# Patient Record
Sex: FEMALE | Race: BLACK OR AFRICAN AMERICAN | NOT HISPANIC OR LATINO | Employment: FULL TIME | URBAN - METROPOLITAN AREA
[De-identification: names, ages, dates, MRNs, and addresses within clinical notes are randomized per-mention and may not be internally consistent; named-entity substitution may affect disease eponyms.]

---

## 2023-02-21 LAB
ANION GAP IN SER/PLAS: 12 MMOL/L (ref 10–20)
CALCIDIOL (25 OH VITAMIN D3) (NG/ML) IN SER/PLAS: 14 NG/ML
CALCIUM (MG/DL) IN SER/PLAS: 9.3 MG/DL (ref 8.6–10.6)
CARBON DIOXIDE, TOTAL (MMOL/L) IN SER/PLAS: 27 MMOL/L (ref 21–32)
CHLORIDE (MMOL/L) IN SER/PLAS: 106 MMOL/L (ref 98–107)
CREATININE (MG/DL) IN SER/PLAS: 0.81 MG/DL (ref 0.5–1.05)
ERYTHROCYTE DISTRIBUTION WIDTH (RATIO) BY AUTOMATED COUNT: 13.2 % (ref 11.5–14.5)
ERYTHROCYTE MEAN CORPUSCULAR HEMOGLOBIN CONCENTRATION (G/DL) BY AUTOMATED: 31 G/DL (ref 32–36)
ERYTHROCYTE MEAN CORPUSCULAR VOLUME (FL) BY AUTOMATED COUNT: 83 FL (ref 80–100)
ERYTHROCYTES (10*6/UL) IN BLOOD BY AUTOMATED COUNT: 4.69 X10E12/L (ref 4–5.2)
ESTIMATED AVERAGE GLUCOSE FOR HBA1C: 123 MG/DL
GFR FEMALE: >90 ML/MIN/1.73M2
GLUCOSE (MG/DL) IN SER/PLAS: 111 MG/DL (ref 74–99)
HEMATOCRIT (%) IN BLOOD BY AUTOMATED COUNT: 39 % (ref 36–46)
HEMOGLOBIN (G/DL) IN BLOOD: 12.1 G/DL (ref 12–16)
HEMOGLOBIN A1C/HEMOGLOBIN TOTAL IN BLOOD: 5.9 %
HIV 1/ 2 AG/AB SCREEN: NONREACTIVE
LEUKOCYTES (10*3/UL) IN BLOOD BY AUTOMATED COUNT: 4.6 X10E9/L (ref 4.4–11.3)
NRBC (PER 100 WBCS) BY AUTOMATED COUNT: 0 /100 WBC (ref 0–0)
PLATELETS (10*3/UL) IN BLOOD AUTOMATED COUNT: 290 X10E9/L (ref 150–450)
POTASSIUM (MMOL/L) IN SER/PLAS: 3.8 MMOL/L (ref 3.5–5.3)
SODIUM (MMOL/L) IN SER/PLAS: 141 MMOL/L (ref 136–145)
THYROTROPIN (MIU/L) IN SER/PLAS BY DETECTION LIMIT <= 0.05 MIU/L: 1.08 MIU/L (ref 0.44–3.98)
UREA NITROGEN (MG/DL) IN SER/PLAS: 17 MG/DL (ref 6–23)

## 2023-02-22 LAB
CHLAMYDIA TRACH., AMPLIFIED: NEGATIVE
N. GONORRHEA, AMPLIFIED: NEGATIVE
SYPHILIS TOTAL AB: NONREACTIVE
TRICHOMONAS VAGINALIS: NEGATIVE

## 2023-10-17 PROBLEM — H61.23 BILATERAL IMPACTED CERUMEN: Status: ACTIVE | Noted: 2023-10-17

## 2023-10-17 PROBLEM — R42 VERTIGO: Status: ACTIVE | Noted: 2023-10-17

## 2023-10-17 PROBLEM — R06.7 SNEEZING WITH WATERY EYES: Status: ACTIVE | Noted: 2023-10-17

## 2023-10-17 PROBLEM — O46.90 VAGINAL BLEEDING DURING PREGNANCY (HHS-HCC): Status: ACTIVE | Noted: 2023-10-17

## 2023-10-17 PROBLEM — H93.12 SUBJECTIVE TINNITUS OF LEFT EAR: Status: ACTIVE | Noted: 2023-10-17

## 2023-10-17 PROBLEM — E83.42 HYPOMAGNESEMIA: Status: ACTIVE | Noted: 2023-10-17

## 2023-10-17 PROBLEM — G43.909 MIGRAINE WITHOUT STATUS MIGRAINOSUS, NOT INTRACTABLE: Status: ACTIVE | Noted: 2023-10-17

## 2023-10-17 PROBLEM — G43.009 MIGRAINE WITHOUT AURA AND WITHOUT STATUS MIGRAINOSUS, NOT INTRACTABLE: Status: ACTIVE | Noted: 2023-10-17

## 2023-10-17 PROBLEM — R09.81 NASAL CONGESTION: Status: ACTIVE | Noted: 2023-10-17

## 2023-10-17 PROBLEM — F41.0 PANIC ATTACK: Status: ACTIVE | Noted: 2023-10-17

## 2023-10-17 PROBLEM — T83.32XA DISPLACEMENT OF INTRAUTERINE CONTRACEPTIVE DEVICE: Status: ACTIVE | Noted: 2023-10-17

## 2023-10-17 PROBLEM — R35.0 URINARY FREQUENCY: Status: ACTIVE | Noted: 2023-10-17

## 2023-10-17 PROBLEM — E11.9 TYPE 2 DIABETES MELLITUS (MULTI): Status: ACTIVE | Noted: 2023-10-17

## 2023-10-17 PROBLEM — J31.0 RHINITIS MEDICAMENTOSA: Status: ACTIVE | Noted: 2023-10-17

## 2023-10-17 PROBLEM — N62 LARGE BREASTS: Status: ACTIVE | Noted: 2023-10-17

## 2023-10-17 PROBLEM — R44.8 FACIAL PRESSURE: Status: ACTIVE | Noted: 2023-10-17

## 2023-10-17 PROBLEM — J06.9 UPPER RESPIRATORY INFECTION: Status: ACTIVE | Noted: 2023-10-17

## 2023-10-17 PROBLEM — T48.5X5A RHINITIS MEDICAMENTOSA: Status: ACTIVE | Noted: 2023-10-17

## 2023-10-17 PROBLEM — H69.93 DYSFUNCTION OF BOTH EUSTACHIAN TUBES: Status: ACTIVE | Noted: 2023-10-17

## 2023-10-17 PROBLEM — R79.89 LOW THYROID STIMULATING HORMONE (TSH) LEVEL: Status: ACTIVE | Noted: 2023-10-17

## 2023-10-17 PROBLEM — K21.9 GERD (GASTROESOPHAGEAL REFLUX DISEASE): Status: ACTIVE | Noted: 2023-10-17

## 2023-10-17 PROBLEM — R09.A2 GLOBUS SENSATION: Status: ACTIVE | Noted: 2023-10-17

## 2023-10-17 PROBLEM — H93.8X3 SENSATION OF FULLNESS IN BOTH EARS: Status: ACTIVE | Noted: 2023-10-17

## 2023-10-17 PROBLEM — H72.91 PERFORATION OF RIGHT TYMPANIC MEMBRANE: Status: ACTIVE | Noted: 2023-10-17

## 2023-10-17 PROBLEM — O09.899 H/O PRETERM DELIVERY, CURRENTLY PREGNANT (HHS-HCC): Status: ACTIVE | Noted: 2023-10-17

## 2023-10-17 PROBLEM — H04.209 SNEEZING WITH WATERY EYES: Status: ACTIVE | Noted: 2023-10-17

## 2023-10-17 PROBLEM — E55.9 VITAMIN D DEFICIENCY: Status: ACTIVE | Noted: 2023-10-17

## 2023-10-17 PROBLEM — R00.2 PALPITATIONS: Status: ACTIVE | Noted: 2023-10-17

## 2023-10-17 RX ORDER — GUAIFENESIN 400 MG/1
1 TABLET ORAL EVERY 4 HOURS PRN
COMMUNITY
Start: 2022-02-08 | End: 2024-05-08 | Stop reason: WASHOUT

## 2023-10-17 RX ORDER — ERGOCALCIFEROL 1.25 MG/1
50000 CAPSULE ORAL
COMMUNITY
Start: 2022-01-27 | End: 2024-05-08 | Stop reason: WASHOUT

## 2023-10-17 RX ORDER — DULAGLUTIDE 1.5 MG/.5ML
1.5 INJECTION, SOLUTION SUBCUTANEOUS
COMMUNITY
End: 2023-10-19 | Stop reason: SDUPTHER

## 2023-10-17 RX ORDER — ETONOGESTREL 68 MG/1
IMPLANT SUBCUTANEOUS
COMMUNITY

## 2023-10-17 RX ORDER — FLUTICASONE PROPIONATE 50 MCG
2 SPRAY, SUSPENSION (ML) NASAL 2 TIMES DAILY
COMMUNITY
Start: 2022-02-08 | End: 2023-10-19 | Stop reason: SDUPTHER

## 2023-10-17 RX ORDER — ALBUTEROL SULFATE 90 UG/1
1-2 AEROSOL, METERED RESPIRATORY (INHALATION)
COMMUNITY
Start: 2022-02-08 | End: 2023-10-19 | Stop reason: SDUPTHER

## 2023-10-17 RX ORDER — BENZONATATE 100 MG/1
1-2 CAPSULE ORAL NIGHTLY PRN
COMMUNITY
Start: 2022-02-08 | End: 2024-05-08 | Stop reason: WASHOUT

## 2023-10-17 RX ORDER — NAPROXEN 500 MG/1
1 TABLET ORAL
COMMUNITY
Start: 2022-01-26 | End: 2023-10-19 | Stop reason: SDUPTHER

## 2023-10-17 RX ORDER — IBUPROFEN 600 MG/1
1 TABLET ORAL EVERY 6 HOURS PRN
COMMUNITY
Start: 2021-11-06

## 2023-10-17 RX ORDER — ACETAMINOPHEN 325 MG/1
3 TABLET ORAL EVERY 4 HOURS PRN
COMMUNITY
Start: 2021-11-06 | End: 2024-05-08 | Stop reason: WASHOUT

## 2023-10-19 ENCOUNTER — OFFICE VISIT (OUTPATIENT)
Dept: PRIMARY CARE | Facility: CLINIC | Age: 33
End: 2023-10-19
Payer: COMMERCIAL

## 2023-10-19 VITALS
OXYGEN SATURATION: 100 % | SYSTOLIC BLOOD PRESSURE: 128 MMHG | TEMPERATURE: 97.2 F | RESPIRATION RATE: 18 BRPM | HEIGHT: 64 IN | HEART RATE: 80 BPM | BODY MASS INDEX: 44.22 KG/M2 | WEIGHT: 259 LBS | DIASTOLIC BLOOD PRESSURE: 84 MMHG

## 2023-10-19 DIAGNOSIS — E55.9 VITAMIN D DEFICIENCY: ICD-10-CM

## 2023-10-19 DIAGNOSIS — M54.9 UPPER BACK PAIN: Primary | ICD-10-CM

## 2023-10-19 DIAGNOSIS — E66.01 CLASS 3 SEVERE OBESITY WITHOUT SERIOUS COMORBIDITY WITH BODY MASS INDEX (BMI) OF 40.0 TO 44.9 IN ADULT, UNSPECIFIED OBESITY TYPE (MULTI): ICD-10-CM

## 2023-10-19 DIAGNOSIS — E88.819 INSULIN RESISTANCE: ICD-10-CM

## 2023-10-19 DIAGNOSIS — F43.21 GRIEF: ICD-10-CM

## 2023-10-19 LAB
25(OH)D3 SERPL-MCNC: 29 NG/ML (ref 30–100)
ANION GAP SERPL CALC-SCNC: 14 MMOL/L (ref 10–20)
BUN SERPL-MCNC: 12 MG/DL (ref 6–23)
CALCIUM SERPL-MCNC: 10.2 MG/DL (ref 8.6–10.6)
CHLORIDE SERPL-SCNC: 105 MMOL/L (ref 98–107)
CO2 SERPL-SCNC: 25 MMOL/L (ref 21–32)
CREAT SERPL-MCNC: 0.81 MG/DL (ref 0.5–1.05)
ERYTHROCYTE [DISTWIDTH] IN BLOOD BY AUTOMATED COUNT: 13.3 % (ref 11.5–14.5)
EST. AVERAGE GLUCOSE BLD GHB EST-MCNC: 117 MG/DL
GFR SERPL CREATININE-BSD FRML MDRD: >90 ML/MIN/1.73M*2
GLUCOSE SERPL-MCNC: 91 MG/DL (ref 74–99)
HBA1C MFR BLD: 5.7 %
HCT VFR BLD AUTO: 40.7 % (ref 36–46)
HGB BLD-MCNC: 12.7 G/DL (ref 12–16)
MCH RBC QN AUTO: 26.6 PG (ref 26–34)
MCHC RBC AUTO-ENTMCNC: 31.2 G/DL (ref 32–36)
MCV RBC AUTO: 85 FL (ref 80–100)
NRBC BLD-RTO: 0 /100 WBCS (ref 0–0)
PLATELET # BLD AUTO: 317 X10*3/UL (ref 150–450)
PMV BLD AUTO: 10.8 FL (ref 7.5–11.5)
POTASSIUM SERPL-SCNC: 4 MMOL/L (ref 3.5–5.3)
RBC # BLD AUTO: 4.78 X10*6/UL (ref 4–5.2)
SODIUM SERPL-SCNC: 140 MMOL/L (ref 136–145)
TSH SERPL-ACNC: 0.73 MIU/L (ref 0.44–3.98)
WBC # BLD AUTO: 4.7 X10*3/UL (ref 4.4–11.3)

## 2023-10-19 PROCEDURE — 1036F TOBACCO NON-USER: CPT | Performed by: NURSE PRACTITIONER

## 2023-10-19 PROCEDURE — 82306 VITAMIN D 25 HYDROXY: CPT | Mod: CMCLAB | Performed by: NURSE PRACTITIONER

## 2023-10-19 PROCEDURE — 99214 OFFICE O/P EST MOD 30 MIN: CPT | Performed by: NURSE PRACTITIONER

## 2023-10-19 PROCEDURE — 85027 COMPLETE CBC AUTOMATED: CPT | Mod: CMCLAB | Performed by: NURSE PRACTITIONER

## 2023-10-19 PROCEDURE — 80048 BASIC METABOLIC PNL TOTAL CA: CPT | Performed by: NURSE PRACTITIONER

## 2023-10-19 PROCEDURE — 3008F BODY MASS INDEX DOCD: CPT | Performed by: NURSE PRACTITIONER

## 2023-10-19 PROCEDURE — 83036 HEMOGLOBIN GLYCOSYLATED A1C: CPT | Performed by: NURSE PRACTITIONER

## 2023-10-19 PROCEDURE — 3074F SYST BP LT 130 MM HG: CPT | Performed by: NURSE PRACTITIONER

## 2023-10-19 PROCEDURE — 36415 COLL VENOUS BLD VENIPUNCTURE: CPT | Performed by: NURSE PRACTITIONER

## 2023-10-19 PROCEDURE — 3079F DIAST BP 80-89 MM HG: CPT | Performed by: NURSE PRACTITIONER

## 2023-10-19 PROCEDURE — 84443 ASSAY THYROID STIM HORMONE: CPT | Performed by: NURSE PRACTITIONER

## 2023-10-19 PROCEDURE — 3044F HG A1C LEVEL LT 7.0%: CPT | Performed by: NURSE PRACTITIONER

## 2023-10-19 RX ORDER — FLUTICASONE PROPIONATE 50 MCG
2 SPRAY, SUSPENSION (ML) NASAL DAILY
Qty: 16 G | Refills: 3 | Status: SHIPPED | OUTPATIENT
Start: 2023-10-19 | End: 2023-11-07

## 2023-10-19 RX ORDER — NAPROXEN 500 MG/1
500 TABLET ORAL
Qty: 90 TABLET | Refills: 3 | Status: SHIPPED | OUTPATIENT
Start: 2023-10-19

## 2023-10-19 RX ORDER — ALBUTEROL SULFATE 90 UG/1
1-2 AEROSOL, METERED RESPIRATORY (INHALATION) EVERY 4 HOURS PRN
Qty: 18 G | Refills: 3 | Status: SHIPPED | OUTPATIENT
Start: 2023-10-19 | End: 2024-05-08 | Stop reason: WASHOUT

## 2023-10-19 RX ORDER — DULAGLUTIDE 1.5 MG/.5ML
1.5 INJECTION, SOLUTION SUBCUTANEOUS
Qty: 2 ML | Refills: 3 | Status: SHIPPED | OUTPATIENT
Start: 2023-10-19 | End: 2024-05-08 | Stop reason: WASHOUT

## 2023-10-19 ASSESSMENT — COLUMBIA-SUICIDE SEVERITY RATING SCALE - C-SSRS
2. HAVE YOU ACTUALLY HAD ANY THOUGHTS OF KILLING YOURSELF?: NO
1. IN THE PAST MONTH, HAVE YOU WISHED YOU WERE DEAD OR WISHED YOU COULD GO TO SLEEP AND NOT WAKE UP?: NO
6. HAVE YOU EVER DONE ANYTHING, STARTED TO DO ANYTHING, OR PREPARED TO DO ANYTHING TO END YOUR LIFE?: NO

## 2023-10-19 ASSESSMENT — ENCOUNTER SYMPTOMS
OCCASIONAL FEELINGS OF UNSTEADINESS: 0
LOSS OF SENSATION IN FEET: 0

## 2023-10-19 ASSESSMENT — PATIENT HEALTH QUESTIONNAIRE - PHQ9
2. FEELING DOWN, DEPRESSED OR HOPELESS: NOT AT ALL
SUM OF ALL RESPONSES TO PHQ9 QUESTIONS 1 AND 2: 0
1. LITTLE INTEREST OR PLEASURE IN DOING THINGS: NOT AT ALL

## 2023-10-19 ASSESSMENT — PAIN SCALES - GENERAL: PAINLEVEL: 6

## 2023-10-19 ASSESSMENT — LIFESTYLE VARIABLES
HOW MANY STANDARD DRINKS CONTAINING ALCOHOL DO YOU HAVE ON A TYPICAL DAY: 1 OR 2
HOW OFTEN DO YOU HAVE A DRINK CONTAINING ALCOHOL: MONTHLY OR LESS

## 2023-10-19 NOTE — PATIENT INSTRUCTIONS
Follow up in 6 months or sooner if needed.    Do something for you!   Continue to give yourself time and donald.   Please let me know if there is any way we can help.     Start Trulicity.   Continue to self monitor your blood sugar and take your medications, as directed.   Decrease refined sugars and carbohydrates in your diet.   DO NOT SKIP MEALS! It is important for your blood sugar to have small healthy meals throughout the day.  Make sure to keep a snack on your person at all times, if needed, for low blood sugar.  Exercise for 30 minutes daily.    Drink adequate liquids before, during, and after exercise to prevent dehydration, which can alter blood sugar levels.

## 2023-10-19 NOTE — PROGRESS NOTES
"Subjective   Dorcas Moore is a 33 y.o. female who presents for Follow-up.  HPI    Ms. Moore is here today for follow up   Reports that she has been lost to follow up and self care due to the traumatic loss of her 14 year old daughter in March.  She did not start previously prescribed medication, including Trulicity, but would like to start today. She reports feeling well supported in her grief. She attended a support group with her  and is currently connected with an individual counselor whom she sees once weekly.   Denies SI/HI    Notes ongoing upper back pain that has recently worsened. She notes that her job went from being very active to mostly sitting at a desk, which she feels has contributed to the discomfort. Reports worsening of pain after a long day at work or on days where she is less active. Improvement noted with stretching or acupressure mat. She will take ibuprofen or naproxen as needed with some relief.   Silvano has worked in the past, has not found diclofenac to be helpful     Reports consistent sinus congestion. + ear fullness +vertigo   Has an inhaler listed but does not have one at home, does not use this any longer.   Does have pollen and allergy. Would like to try the inhaler PRN. Takes cetirizine OTC, requesting refill of fluticasone.    All systems reviewed. Review of systems negative except for noted positives in HPI    Objective     /84 (BP Location: Left arm, Patient Position: Sitting, BP Cuff Size: Adult long)   Pulse 80   Temp 36.2 °C (97.2 °F)   Resp 18   Ht 1.626 m (5' 4\")   Wt 117 kg (259 lb)   SpO2 100%   BMI 44.46 kg/m²    Vital signs noted and reviewed.       Physical Exam  Constitutional:       Appearance: Normal appearance.   Cardiovascular:      Rate and Rhythm: Normal rate and regular rhythm.   Pulmonary:      Effort: Pulmonary effort is normal. No respiratory distress.      Breath sounds: Normal breath sounds.   Skin:     General: Skin is warm " and dry.   Neurological:      Mental Status: She is oriented to person, place, and time.   Psychiatric:         Mood and Affect: Mood normal.             Assessment/Plan   Problem List Items Addressed This Visit          Endocrine/Metabolic    Vitamin D deficiency    Relevant Orders    Vitamin D 25-Hydroxy,Total (for eval of Vitamin D levels) (Completed)     Other Visit Diagnoses       Upper back pain    -  Primary    Relevant Medications    naproxen (Naprosyn) 500 mg tablet    Grief        Class 3 severe obesity without serious comorbidity with body mass index (BMI) of 40.0 to 44.9 in adult, unspecified obesity type (CMS/[facility])        Relevant Medications    albuterol (Ventolin HFA) 90 mcg/actuation inhaler    fluticasone (Flonase) 50 mcg/actuation nasal spray    Other Relevant Orders    TSH with reflex to Free T4 if abnormal (Completed)    CBC (Completed)    Basic Metabolic Panel (Completed)    Insulin resistance        Relevant Medications    dulaglutide (Trulicity) 1.5 mg/0.5 mL pen injector injection    Other Relevant Orders    Hemoglobin A1C (Completed)

## 2023-11-02 DIAGNOSIS — E66.01 CLASS 3 SEVERE OBESITY WITHOUT SERIOUS COMORBIDITY WITH BODY MASS INDEX (BMI) OF 40.0 TO 44.9 IN ADULT, UNSPECIFIED OBESITY TYPE (MULTI): ICD-10-CM

## 2023-11-07 RX ORDER — FLUTICASONE PROPIONATE 50 MCG
2 SPRAY, SUSPENSION (ML) NASAL DAILY
Qty: 48 ML | Refills: 2 | Status: SHIPPED | OUTPATIENT
Start: 2023-11-07 | End: 2024-05-08 | Stop reason: WASHOUT

## 2024-01-24 ENCOUNTER — HOSPITAL ENCOUNTER (OUTPATIENT)
Dept: RADIOLOGY | Facility: HOSPITAL | Age: 34
Discharge: HOME | End: 2024-01-24
Payer: COMMERCIAL

## 2024-01-24 ENCOUNTER — OFFICE VISIT (OUTPATIENT)
Dept: ORTHOPEDIC SURGERY | Facility: HOSPITAL | Age: 34
End: 2024-01-24
Payer: COMMERCIAL

## 2024-01-24 VITALS — HEIGHT: 64 IN | WEIGHT: 260 LBS | BODY MASS INDEX: 44.39 KG/M2

## 2024-01-24 DIAGNOSIS — M79.661 RIGHT CALF PAIN: ICD-10-CM

## 2024-01-24 DIAGNOSIS — M79.661 RIGHT CALF PAIN: Primary | ICD-10-CM

## 2024-01-24 PROCEDURE — 73590 X-RAY EXAM OF LOWER LEG: CPT | Mod: RT

## 2024-01-24 PROCEDURE — 93971 EXTREMITY STUDY: CPT | Mod: FOREIGN READ | Performed by: RADIOLOGY

## 2024-01-24 PROCEDURE — 99213 OFFICE O/P EST LOW 20 MIN: CPT | Performed by: PHYSICIAN ASSISTANT

## 2024-01-24 PROCEDURE — 93971 EXTREMITY STUDY: CPT | Mod: FR

## 2024-01-24 PROCEDURE — 3008F BODY MASS INDEX DOCD: CPT | Performed by: PHYSICIAN ASSISTANT

## 2024-01-24 PROCEDURE — 1036F TOBACCO NON-USER: CPT | Performed by: PHYSICIAN ASSISTANT

## 2024-01-24 PROCEDURE — 73590 X-RAY EXAM OF LOWER LEG: CPT | Mod: RIGHT SIDE | Performed by: RADIOLOGY

## 2024-01-24 ASSESSMENT — PAIN DESCRIPTION - DESCRIPTORS: DESCRIPTORS: NAGGING

## 2024-01-24 ASSESSMENT — PAIN - FUNCTIONAL ASSESSMENT: PAIN_FUNCTIONAL_ASSESSMENT: 0-10

## 2024-01-24 ASSESSMENT — PAIN SCALES - GENERAL: PAINLEVEL_OUTOF10: 7

## 2024-01-25 NOTE — PROGRESS NOTES
HPI:  Ms. Dorcas Moore is a 33 y.o. female presenting today for further evaluation of right calf pain. She states this pain has been waxing and waning for about one week. She describes her pain as deep and aching, almost as if there is built up pressure in her right calf. She can not think of a recent injury that may have caused a flare up in her pain but does recall an injury 1 year ago in which she slipped on ice and hurt her left knee. She believes since then, she has been compensating with her right leg and that it could possibly be causing this pain. She states the pain is localized to the posterior right calf and does not travel elsewhere. She admits to some tingling in the leg at times as well. 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 She has tried icing her calf, using heat, elevating the calf and taking Aleve. These all seem to help momentarily but the pain has persisted. She denies recent travel but does admit to a family history of blood clots. Her daughter passed away last year due to a pulmonary embolism.  She has an appointment scheduled with Dr. Charlton on February 13th of this year but decided to come in today for evaluation.      Past Medical History:   Diagnosis Date    11 weeks gestation of pregnancy 09/16/2021    11 weeks gestation of pregnancy    13 weeks gestation of pregnancy 10/04/2021    13 weeks gestation of pregnancy    15 weeks gestation of pregnancy 10/20/2021    15 weeks gestation of pregnancy    16 weeks gestation of pregnancy 11/22/2021    16 weeks gestation of pregnancy    Displacement of intrauterine contraceptive device, initial encounter 08/15/2019    Displacement of intrauterine contraceptive device, initial encounter    Encounter for initial prescription of implantable subdermal contraceptive 11/22/2021    Encounter for initial prescription of Nexplanon    Encounter for routine postpartum follow-up  03/15/2018    Encounter for postpartum care and examination after delivery    Encounter for supervision of other normal pregnancy, first trimester 10/12/2017    Encounter for supervision of other normal pregnancy in first trimester    Encounter for supervision of other normal pregnancy, unspecified trimester     Prenatal care, subsequent pregnancy    Maternal care for cervical incompetence, unspecified trimester 01/03/2018    Pollard cerclage present    Maternal care for other (suspected) fetal abnormality and damage, fetal cardiac anomalies, not applicable or unspecified 02/07/2018    Abnormal fetal echocardiogram affecting antepartum care of mother    Morbid (severe) obesity due to excess calories (CMS/[facility]) 03/15/2018    Obesity, Class III, BMI 40-49.9 (morbid obesity)    Supervision of high risk pregnancy, unspecified, third trimester 01/03/2018    Supervision of high risk pregnancy in third trimester       Past Surgical History:   Procedure Laterality Date    OTHER SURGICAL HISTORY  02/06/2018    Cervical Cerclage During Pregnancy         ROS:  All pertinent positives and negatives listed above in the HPI.    EXAM: BLE  Compartments soft and compressible  Skin intact, no wounds, swelling or signs of infection. No ecchymosis  Sensation intact to light touch   TTP along the posterior right calf  ROM intact  Strength 5/5  PT/DP pulses 2+    IMAGING:  No gross fracture or dislocation noted.      ASSESSMENT:  Right calf pain, r/o DVT    PLAN:  We discussed with the patient the likelihood that this is residual calf pain due to minimal movement throughout the day. However, given her family history of blood clots, we would like her to get a duplex ultrasound of the RLE as soon as possible. We will also refer her to PT for further evaluation and rehabilitation.  If DVT scan is negative, she can get into PT for eval as above.  If symptoms persist, consider workup for lumbar radiculopathy.

## 2024-01-31 ENCOUNTER — OFFICE VISIT (OUTPATIENT)
Dept: ORTHOPEDIC SURGERY | Facility: HOSPITAL | Age: 34
End: 2024-01-31
Payer: COMMERCIAL

## 2024-01-31 DIAGNOSIS — M54.41 ACUTE BILATERAL LOW BACK PAIN WITH RIGHT-SIDED SCIATICA: Primary | ICD-10-CM

## 2024-01-31 PROCEDURE — 99203 OFFICE O/P NEW LOW 30 MIN: CPT | Performed by: FAMILY MEDICINE

## 2024-01-31 PROCEDURE — 1036F TOBACCO NON-USER: CPT | Performed by: FAMILY MEDICINE

## 2024-01-31 PROCEDURE — 99213 OFFICE O/P EST LOW 20 MIN: CPT | Performed by: FAMILY MEDICINE

## 2024-01-31 PROCEDURE — 3008F BODY MASS INDEX DOCD: CPT | Performed by: FAMILY MEDICINE

## 2024-01-31 RX ORDER — NAPROXEN 500 MG/1
500 TABLET ORAL
Qty: 60 TABLET | Refills: 1 | Status: SHIPPED | OUTPATIENT
Start: 2024-01-31 | End: 2024-02-01 | Stop reason: SDUPTHER

## 2024-01-31 ASSESSMENT — PAIN DESCRIPTION - DESCRIPTORS: DESCRIPTORS: TINGLING;NUMBNESS

## 2024-01-31 ASSESSMENT — PAIN - FUNCTIONAL ASSESSMENT: PAIN_FUNCTIONAL_ASSESSMENT: 0-10

## 2024-01-31 NOTE — LETTER
February 2, 2024     Donal Diego MD  [address] [place] [address] [place] [address]    Patient: Dorcas Moore   YOB: 1990   Date of Visit: 1/31/2024       Dear Dr. Donal Diego MD:    Thank you for referring Dorcas Moore to me for evaluation. Below are my notes for this consultation.  If you have questions, please do not hesitate to call me. I look forward to following your patient along with you.       Sincerely,     Juan Charlton MD      CC: Adela Elizabeth PA-C  ______________________________________________________________________________________    Sports Medicine Office Note    Today's Date:  01/31/2024     HPI: Dorcas Moore is a 33 y.o.   who presents today for evaluation of right leg pain from her back down to her feet upon referral by FRED Galeana.    Today, 1/31/2024, she complains of pain radiating from her posterior right hip down her leg into the lateral ankle, foot and into her toes.  This has been bothering her for over 2 weeks.  She also complains of numbness and tingling in the same distribution.  She was recently evaluated in the urgent Ortho clinic by FRED Galeana.  The patient reports that the focus was on her right calf and not her back pain.  She was sent for ultrasound which was negative for DVT.  She was referred to me for further evaluation.  She has been taking over-the-counter naproxen 2 tablets in the morning but not every day.  She has had similar issues in the past.  She has no symptoms down the left leg.  She has no pain in the hip or groin.    She has no other complaints.    Physical Examination:     She is an obese adult female in no acute distress.  There is tenderness along the lower lumbar midline and into the right SI joint and posterior hemipelvis.  There is no palpable defect or swelling.  Both hips are without obvious signs of acute bony deformity, swelling or instability.  Range of motion is full,  pain-free and symmetrical.  Straight leg raise test is positive on the right.  Sensory and vascular exam are normal as compared to the left leg.  Gait is near normal and tandem.    Problem List Items Addressed This Visit             ICD-10-CM    Acute bilateral low back pain with right-sided sciatica - Primary M54.41    Relevant Medications    naproxen (Naprosyn) 500 mg tablet    Other Relevant Orders    Referral to Physical Therapy       Assessment and Plan:     We reviewed the exam and x-ray findings and discussed the conservative and surgical treatment options. We agreed that her pain is coming from her back and consistent with sciatica.  This is likely contributing to her right calf pain that has been ruled out by FRED Galeana with a negative Doppler ultrasound.  She was referred to formal physical therapy and will take prescription naproxen.  She will follow-up with one of our spine specialist if she has no improvement.    **This note was dictated using Dragon speech recognition software and was not corrected for spelling or grammatical errors**.    Juan Charlton MD  Sports Medicine Specialist  [facility] [place]

## 2024-01-31 NOTE — PROGRESS NOTES
Sports Medicine Office Note    Today's Date:  01/31/2024     HPI: Dorcas Moore is a 33 y.o.   who presents today for evaluation of right leg pain from her back down to her feet upon referral by FRED Galeana.    Today, 1/31/2024, she complains of pain radiating from her posterior right hip down her leg into the lateral ankle, foot and into her toes.  This has been bothering her for over 2 weeks.  She also complains of numbness and tingling in the same distribution.  She was recently evaluated in the urgent Ortho clinic by FRED Galeana.  The patient reports that the focus was on her right calf and not her back pain.  She was sent for ultrasound which was negative for DVT.  She was referred to me for further evaluation.  She has been taking over-the-counter naproxen 2 tablets in the morning but not every day.  She has had similar issues in the past.  She has no symptoms down the left leg.  She has no pain in the hip or groin.    She has no other complaints.    Physical Examination:     She is an obese adult female in no acute distress.  There is tenderness along the lower lumbar midline and into the right SI joint and posterior hemipelvis.  There is no palpable defect or swelling.  Both hips are without obvious signs of acute bony deformity, swelling or instability.  Range of motion is full, pain-free and symmetrical.  Straight leg raise test is positive on the right.  Sensory and vascular exam are normal as compared to the left leg.  Gait is near normal and tandem.    Problem List Items Addressed This Visit             ICD-10-CM    Acute bilateral low back pain with right-sided sciatica - Primary M54.41    Relevant Medications    naproxen (Naprosyn) 500 mg tablet    Other Relevant Orders    Referral to Physical Therapy       Assessment and Plan:     We reviewed the exam and x-ray findings and discussed the conservative and surgical treatment options. We agreed that her pain is  coming from her back and consistent with sciatica.  This is likely contributing to her right calf pain that has been ruled out by FRED Galeana with a negative Doppler ultrasound.  She was referred to formal physical therapy and will take prescription naproxen.  She will follow-up with one of our spine specialist if she has no improvement.    **This note was dictated using Dragon speech recognition software and was not corrected for spelling or grammatical errors**.    Juan Charlton MD  Sports Medicine Specialist  [facility] [place]

## 2024-02-01 DIAGNOSIS — M54.41 ACUTE BILATERAL LOW BACK PAIN WITH RIGHT-SIDED SCIATICA: ICD-10-CM

## 2024-02-01 DIAGNOSIS — M54.9 UPPER BACK PAIN: ICD-10-CM

## 2024-02-01 RX ORDER — NAPROXEN 500 MG/1
500 TABLET ORAL
Qty: 60 TABLET | Refills: 1 | Status: SHIPPED | OUTPATIENT
Start: 2024-02-01 | End: 2024-04-01

## 2024-02-01 NOTE — TELEPHONE ENCOUNTER
We seen this patient yesterday her pharmacy faxed over a form sent to the mail order? Please resend to local pharmacy its in thanks Please advise

## 2024-02-07 ENCOUNTER — OFFICE VISIT (OUTPATIENT)
Dept: ORTHOPEDIC SURGERY | Facility: CLINIC | Age: 34
End: 2024-02-07
Payer: COMMERCIAL

## 2024-02-07 DIAGNOSIS — M54.41 ACUTE BILATERAL LOW BACK PAIN WITH RIGHT-SIDED SCIATICA: ICD-10-CM

## 2024-02-07 DIAGNOSIS — G89.29 CHRONIC NECK PAIN: Primary | ICD-10-CM

## 2024-02-07 DIAGNOSIS — M54.2 CHRONIC NECK PAIN: Primary | ICD-10-CM

## 2024-02-07 PROCEDURE — 3008F BODY MASS INDEX DOCD: CPT | Performed by: PHYSICIAN ASSISTANT

## 2024-02-07 PROCEDURE — 1036F TOBACCO NON-USER: CPT | Performed by: PHYSICIAN ASSISTANT

## 2024-02-07 PROCEDURE — 99213 OFFICE O/P EST LOW 20 MIN: CPT | Performed by: PHYSICIAN ASSISTANT

## 2024-02-07 RX ORDER — MELOXICAM 15 MG/1
15 TABLET ORAL DAILY
Qty: 30 TABLET | Refills: 3 | Status: SHIPPED | OUTPATIENT
Start: 2024-02-07

## 2024-02-07 ASSESSMENT — PAIN - FUNCTIONAL ASSESSMENT: PAIN_FUNCTIONAL_ASSESSMENT: 0-10

## 2024-02-07 ASSESSMENT — PAIN SCALES - GENERAL: PAINLEVEL_OUTOF10: 7

## 2024-02-13 ENCOUNTER — APPOINTMENT (OUTPATIENT)
Dept: ORTHOPEDIC SURGERY | Facility: HOSPITAL | Age: 34
End: 2024-02-13
Payer: COMMERCIAL

## 2024-02-15 NOTE — PROGRESS NOTES
Dorcas is a 33-year-old female reporting clinic today for evaluation of her chronic neck and low back pain.    She had a fall in 2009, she has had neck and back pain since then.  Recently over the last 2.5 weeks she has developed tingling in her right leg.  She has midline neck pain that radiates down her spine and seems to settle between her shoulder blades.  She has right-sided low back pain with numbness and tingling radiating down her right leg.  She has no left leg symptoms.  She denies weakness, dragging or tripping over her feet.  She has full control of her bowel and bladder.  She has no pain radiating down her arms.  She has not noticed a difference in her balance or fine motor skills.    Treatment thus far has consisted of over-the-counter ibuprofen, ice/heat, and increasing her exercise.  She has lost weight.  She is scheduled to start physical therapy for her low back pain on 2/26.    Family, social, and medical histories are obtained and reviewed.    ROS: All other systems have been reviewed and are negative except as previously noted in history of present illness.    Physical Exam:  Const: Well-appearing, well-nourished female in no distress.  Eyes: Normal appearing sclera and conjunctiva, no jaundice, pupils normal in appearance.  Neck: No masses or lymphadenopathy appreciated.  Resp: breathing comfortably, normal respiratory rate rate.  CV: No upper or lower extremity edema.  Musculoskeletal: Normal gait.  Able to heel/toe walk without difficulty.  Cervical and lumbar ROM is supple.  Strength exam of the upper and lower extremities reveals 5/5 strength in all major muscle groups.  No intrinsic wasting.  Negative Spurling sign.  Negative straight leg raise bilaterally.  Neuro: Sensation is intact and equal bilaterally. Deep tendon reflexes are normal and symmetric.  No clonus, negative Garcia sign, negative Lhermitte's sign  Skin: Intact without any lesions, normal turgor.  Psych: Alert and  oriented x3, normal mood and affect.    The plan is to start conservative treatment for her chronic neck and low back pain with acute right leg radiculopathy.  She is already scheduled to start physical therapy for her low back, I gave her a referral to add PT for her cervical spine.  A prescription for meloxicam 15 mg was sent to her pharmacy for inflammation.  She should discontinue the use of all other NSAIDs while taking this medication.  I am optimistic her chronic symptoms will improve with conservative treatment.  If she does not see improvement in her radicular symptoms after 6 weeks physical therapy she can follow-up with me at which time I will consider an MRI of the lumbar spine.    **This note was dictated using speech recognition software and was not corrected for spelling or grammatical errors**

## 2024-02-26 ENCOUNTER — EVALUATION (OUTPATIENT)
Dept: PHYSICAL THERAPY | Facility: CLINIC | Age: 34
End: 2024-02-26
Payer: COMMERCIAL

## 2024-02-26 DIAGNOSIS — M79.661 RIGHT CALF PAIN: ICD-10-CM

## 2024-02-26 DIAGNOSIS — M54.2 NECK PAIN: Primary | ICD-10-CM

## 2024-02-26 PROCEDURE — 97110 THERAPEUTIC EXERCISES: CPT | Mod: GP | Performed by: PHYSICAL THERAPIST

## 2024-02-26 PROCEDURE — 97161 PT EVAL LOW COMPLEX 20 MIN: CPT | Mod: GP | Performed by: PHYSICAL THERAPIST

## 2024-02-26 ASSESSMENT — ENCOUNTER SYMPTOMS
OCCASIONAL FEELINGS OF UNSTEADINESS: 0
LOSS OF SENSATION IN FEET: 0
DEPRESSION: 0

## 2024-02-26 NOTE — PROGRESS NOTES
Physical Therapy    Physical Therapy Evaluation and Treatment      Patient Name: Dorcas Moore  MRN: [id number]  Today's Date: 2/26/2024  Visit: 1  Insurance: Reviewed  Physician: Yelena Barrientos   PT Evaluation Time Entry  PT Evaluation (Low) Time Entry: 25  PT Therapeutic Procedures Time Entry  Therapeutic Exercise Time Entry: 10  PT Modalities Time Entry  Hot/Cold Pack Time Entry: 10                Time Calculation  Start Time: 0500  Stop Time: 0545  Time Calculation (min): 45 min    Assessment: Patient seen in PT for Initial Evaluation for neck pain.   Patient presents with postural deviation  decreased neck ROM , decreased UT strength, UT tenderness.  Functionally, patient  unable to lift heavy items.  Patient rates NDI at 6%.    PT Assessment  Rehab Prognosis: Good     Plan:  Continue with POC  UBE, postural correction exercises, UE strength, neck stretches, posture/body mechanics, HP  OP PT Plan  PT Plan: Skilled PT  PT Frequency: 1 time per week  Duration: 8  Onset Date: 02/07/24  Rehab Potential: Good  Plan of Care Agreement: Patient    Current Problem:   1. Neck pain        2. Right calf pain  Referral to Physical Therapy          Subjective    General: Patient with a history of neck pain since  2020.  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  Patient treated with chiropractic - manipulation/exercises.   Pain worse recently since 1/24.  Patient relates neck pain increased with switching to sitting job at work.   Patient complains of pain in the region of the bilateral UT, rhomboids , sharp > ache > burning pain, 7/10 at worst last 7 days.  Patient's pain is worse with sitting at computer, lifting, occasional difficulty with sleeping - on back.  Functionally, patient is unable to do heavy lifting.  Considering breast reduction.  No Xrays       Precautions: none       Prior Level of Function: No limitations       Objective       Postural deviation: FH, rounded shoulders   cervical ROM to 35 flex, 40  ext, 45 right SB,  45 left SB, 70 right rotation, and 70 left rotation  neck strength to 5/5 flexion, 5/5 extension, 5/5 right SB, and 5/5 left SB. Pain with resisted shoulder shrug  Shoulder ROM: WNL  UE strength: 5/5 shoulder abd strength  Tender to palpation at the bilateral UT     Outcome Measures:    NDI = 6%     Treatments:  Patient instructed in HEP including: resisted shoulder retraction and extension with green theraband, chin tucks, and doorway stretch 20X each.  Instructed in use of lumbar roll.  (10 minutes)  HP to the cervical region 10 minutes    EDUCATION: HEP       Goals:  Active       PT Problem       PT Goal 1       Start:  02/26/24    Expected End:  05/26/24       Decrease neck pain to 3/10         PT Goal 2       Start:  02/26/24    Expected End:  05/26/24       Able to correct FH posture with chin tuck         PT Goal 3       Start:  02/26/24    Expected End:  05/26/24       Improve NDI to 0         PT Goal 4       Start:  02/26/24    Expected End:  05/26/24       Maximize cervical ROM

## 2024-04-01 ENCOUNTER — APPOINTMENT (OUTPATIENT)
Dept: PRIMARY CARE | Facility: CLINIC | Age: 34
End: 2024-04-01
Payer: COMMERCIAL

## 2024-04-09 ENCOUNTER — DOCUMENTATION (OUTPATIENT)
Dept: PHYSICAL THERAPY | Facility: CLINIC | Age: 34
End: 2024-04-09
Payer: COMMERCIAL

## 2024-04-09 NOTE — PROGRESS NOTES
Physical Therapy    Discharge Summary    Name: Dorcas Moore  MRN: [id number]  : 1990  Date: 24    Discharge Summary: PT    Discharge Information: Date of discharge 24, Date of last visit 24, Date of evaluation 24, Number of attended visits 1, Referred by Floyd, and Referred for neck pain    Therapy Summary: Patient seen in PT for one visit for neck pain.  Patient did not follow up in PT and did not reschedule after initial evaluation.      Discharge Status: Status unknown.     Rehab Discharge Reason: Failed to schedule and/or keep follow-up appointment(s)

## 2024-04-22 ENCOUNTER — LAB REQUISITION (OUTPATIENT)
Dept: LAB | Facility: HOSPITAL | Age: 34
End: 2024-04-22
Payer: COMMERCIAL

## 2024-04-22 DIAGNOSIS — R30.0 DYSURIA: ICD-10-CM

## 2024-04-22 DIAGNOSIS — N76.0 ACUTE VAGINITIS: ICD-10-CM

## 2024-04-22 PROCEDURE — 87086 URINE CULTURE/COLONY COUNT: CPT

## 2024-04-22 PROCEDURE — 87102 FUNGUS ISOLATION CULTURE: CPT

## 2024-04-24 LAB — BACTERIA UR CULT: NORMAL

## 2024-04-25 ENCOUNTER — OFFICE VISIT (OUTPATIENT)
Dept: PRIMARY CARE | Facility: CLINIC | Age: 34
End: 2024-04-25
Payer: COMMERCIAL

## 2024-04-25 VITALS
DIASTOLIC BLOOD PRESSURE: 85 MMHG | HEART RATE: 78 BPM | BODY MASS INDEX: 44.63 KG/M2 | TEMPERATURE: 97.3 F | WEIGHT: 260 LBS | SYSTOLIC BLOOD PRESSURE: 130 MMHG

## 2024-04-25 DIAGNOSIS — M54.41 ACUTE BILATERAL LOW BACK PAIN WITH RIGHT-SIDED SCIATICA: ICD-10-CM

## 2024-04-25 DIAGNOSIS — H93.A2 PULSATILE TINNITUS OF LEFT EAR: Primary | ICD-10-CM

## 2024-04-25 PROCEDURE — 3075F SYST BP GE 130 - 139MM HG: CPT | Performed by: FAMILY MEDICINE

## 2024-04-25 PROCEDURE — 3079F DIAST BP 80-89 MM HG: CPT | Performed by: FAMILY MEDICINE

## 2024-04-25 PROCEDURE — 3008F BODY MASS INDEX DOCD: CPT | Performed by: FAMILY MEDICINE

## 2024-04-25 PROCEDURE — 99214 OFFICE O/P EST MOD 30 MIN: CPT | Performed by: FAMILY MEDICINE

## 2024-04-25 ASSESSMENT — PAIN SCALES - GENERAL: PAINLEVEL: 0-NO PAIN

## 2024-04-25 NOTE — PROGRESS NOTES
Subjective   Patient ID: Dorcas Moore is a 34 y.o. female who presents for Annual Exam (New pt).  HPI  The patient is a 35 yo AA female with a history of DM II, subjective pulsating tinnitus, GERD,  vit D deficiency, migraine headaches, here for establishment of care and for:  1-  The evaluation of a pulsatile left ear sound that started in 2020 and is getting worse over time.  Mostly with increased activity.  2- Weight management.  3- Low back pain s/p an accidental fall ion 2009.  It started a week ago. Evaluated at a local urgent care and treated with Meloxicam.    A review of system was completed.  All systems were reviewed and were normal, except for the ones that are listed in the HPI.    Objective   Physical Exam    Assessment/Plan   Problem List Items Addressed This Visit       Acute bilateral low back pain with right-sided sciatica     -Meloxicam refilled.  -Completed on PT session.         Pulsatile tinnitus of left ear - Primary     -MRA brain and neck ordered.  -ENT referral done.          Relevant Orders    MR angio head wo IV contrast    MR angio neck wo IV contrast    Referral to ENT    BMI 40.0-44.9, adult (Multi)     -Weight management referral.          Relevant Orders    Referral to Endocrinology    Referral to Nutrition Services    Patient to return to office in 2-4 weeks as needed.

## 2024-04-27 LAB — YEAST SPEC QL CULT: NORMAL

## 2024-05-03 ENCOUNTER — APPOINTMENT (OUTPATIENT)
Dept: OTOLARYNGOLOGY | Facility: HOSPITAL | Age: 34
End: 2024-05-03
Payer: COMMERCIAL

## 2024-05-04 ENCOUNTER — APPOINTMENT (OUTPATIENT)
Dept: PRIMARY CARE | Facility: CLINIC | Age: 34
End: 2024-05-04
Payer: COMMERCIAL

## 2024-05-07 ENCOUNTER — LAB (OUTPATIENT)
Dept: LAB | Facility: LAB | Age: 34
End: 2024-05-07
Payer: COMMERCIAL

## 2024-05-07 ENCOUNTER — PATIENT MESSAGE (OUTPATIENT)
Dept: PRIMARY CARE | Facility: CLINIC | Age: 34
End: 2024-05-07

## 2024-05-07 ENCOUNTER — TELEMEDICINE (OUTPATIENT)
Dept: PRIMARY CARE | Facility: CLINIC | Age: 34
End: 2024-05-07
Payer: COMMERCIAL

## 2024-05-07 ENCOUNTER — APPOINTMENT (OUTPATIENT)
Dept: PRIMARY CARE | Facility: CLINIC | Age: 34
End: 2024-05-07
Payer: COMMERCIAL

## 2024-05-07 DIAGNOSIS — E55.9 VITAMIN D DEFICIENCY: ICD-10-CM

## 2024-05-07 DIAGNOSIS — D50.0 IRON DEFICIENCY ANEMIA DUE TO CHRONIC BLOOD LOSS: Primary | ICD-10-CM

## 2024-05-07 DIAGNOSIS — R10.30 LOWER ABDOMINAL PAIN: Primary | ICD-10-CM

## 2024-05-07 DIAGNOSIS — R10.30 LOWER ABDOMINAL PAIN: ICD-10-CM

## 2024-05-07 LAB
ALBUMIN SERPL BCP-MCNC: 4 G/DL (ref 3.4–5)
ALP SERPL-CCNC: 63 U/L (ref 33–110)
ALT SERPL W P-5'-P-CCNC: 17 U/L (ref 7–45)
ANION GAP SERPL CALC-SCNC: 10 MMOL/L (ref 10–20)
AST SERPL W P-5'-P-CCNC: 15 U/L (ref 9–39)
B-HCG SERPL-ACNC: <3 MIU/ML
BILIRUB SERPL-MCNC: 0.3 MG/DL (ref 0–1.2)
BUN SERPL-MCNC: 15 MG/DL (ref 6–23)
CALCIUM SERPL-MCNC: 9.3 MG/DL (ref 8.6–10.6)
CHLORIDE SERPL-SCNC: 105 MMOL/L (ref 98–107)
CO2 SERPL-SCNC: 29 MMOL/L (ref 21–32)
CREAT SERPL-MCNC: 1 MG/DL (ref 0.5–1.05)
EGFRCR SERPLBLD CKD-EPI 2021: 75 ML/MIN/1.73M*2
ERYTHROCYTE [DISTWIDTH] IN BLOOD BY AUTOMATED COUNT: 13.3 % (ref 11.5–14.5)
GLUCOSE SERPL-MCNC: 108 MG/DL (ref 74–99)
HCT VFR BLD AUTO: 38.1 % (ref 36–46)
HGB BLD-MCNC: 12 G/DL (ref 12–16)
MCH RBC QN AUTO: 26.5 PG (ref 26–34)
MCHC RBC AUTO-ENTMCNC: 31.5 G/DL (ref 32–36)
MCV RBC AUTO: 84 FL (ref 80–100)
NRBC BLD-RTO: 0 /100 WBCS (ref 0–0)
PLATELET # BLD AUTO: 305 X10*3/UL (ref 150–450)
POTASSIUM SERPL-SCNC: 4.1 MMOL/L (ref 3.5–5.3)
PROT SERPL-MCNC: 6.6 G/DL (ref 6.4–8.2)
RBC # BLD AUTO: 4.52 X10*6/UL (ref 4–5.2)
SODIUM SERPL-SCNC: 140 MMOL/L (ref 136–145)
WBC # BLD AUTO: 5.6 X10*3/UL (ref 4.4–11.3)

## 2024-05-07 PROCEDURE — 36415 COLL VENOUS BLD VENIPUNCTURE: CPT

## 2024-05-07 PROCEDURE — 87800 DETECT AGNT MULT DNA DIREC: CPT

## 2024-05-07 PROCEDURE — 99214 OFFICE O/P EST MOD 30 MIN: CPT | Performed by: FAMILY MEDICINE

## 2024-05-07 PROCEDURE — 84702 CHORIONIC GONADOTROPIN TEST: CPT

## 2024-05-07 PROCEDURE — 87661 TRICHOMONAS VAGINALIS AMPLIF: CPT

## 2024-05-07 PROCEDURE — 3008F BODY MASS INDEX DOCD: CPT | Performed by: FAMILY MEDICINE

## 2024-05-07 PROCEDURE — 85027 COMPLETE CBC AUTOMATED: CPT

## 2024-05-07 PROCEDURE — 80053 COMPREHEN METABOLIC PANEL: CPT

## 2024-05-07 NOTE — PROGRESS NOTES
Subjective   Patient ID: Dorcas Moore is a 34 y.o. female who presents for a chronic lower abdominal pain.    HPI    The patient is a 35 yo AA female with a history of DM II, subjective pulsating tinnitus, GERD,  vit D deficiency, migraine headaches, here for   the management of diffuse lower abdominal pain that started few weeks ago and a second improving.  The pain is described as a pressure in the bladder and around the bladder.  Worse with prolonged sitting.  Patient was seen at the local urgent care facility on April 22, 2024 and had the negative yeast test and urine culture.    A review of system was completed.  All systems were reviewed and were normal, except for the ones that are listed in the HPI.    Objective   Physical Exam    Assessment/Plan   Problem List Items Addressed This Visit       Lower abdominal pain - Primary     -Rule out pregnancy or STD done today with the serum hCG level, urine for gonorrhea/chlamydia/gonorrhea.  -CT scan of the abdomen and pelvis also ordered today; to be done only after the hCG serum level result is available.  Patient notified.  -GYN appointment scheduled for tomorrow.         Relevant Orders    HCG, quantitative, pregnancy    C. Trachomatis / N. Gonorrhoeae, Amplified Detection    Trichomonas vaginalis, Nucleic Acid Detection    CBC    Comprehensive Metabolic Panel    CT abdomen pelvis w IV contrast     Patient to return to office if not better within 1-2 weeks.

## 2024-05-07 NOTE — ASSESSMENT & PLAN NOTE
-Rule out pregnancy or STD done today with the serum hCG level, urine for gonorrhea/chlamydia/gonorrhea.  -CT scan of the abdomen and pelvis also ordered today; to be done only after the hCG serum level result is available.  Patient notified.  -GYN appointment scheduled for tomorrow.

## 2024-05-08 ENCOUNTER — OFFICE VISIT (OUTPATIENT)
Dept: OBSTETRICS AND GYNECOLOGY | Facility: CLINIC | Age: 34
End: 2024-05-08
Payer: COMMERCIAL

## 2024-05-08 VITALS
BODY MASS INDEX: 44.39 KG/M2 | WEIGHT: 260 LBS | DIASTOLIC BLOOD PRESSURE: 78 MMHG | SYSTOLIC BLOOD PRESSURE: 118 MMHG | HEIGHT: 64 IN

## 2024-05-08 DIAGNOSIS — Z01.419 WELL WOMAN EXAM WITH ROUTINE GYNECOLOGICAL EXAM: Primary | ICD-10-CM

## 2024-05-08 LAB
C TRACH RRNA SPEC QL NAA+PROBE: NEGATIVE
N GONORRHOEA DNA SPEC QL PROBE+SIG AMP: NEGATIVE
T VAGINALIS RRNA SPEC QL NAA+PROBE: NEGATIVE

## 2024-05-08 PROCEDURE — 88142 CYTOPATH C/V THIN LAYER: CPT

## 2024-05-08 PROCEDURE — 3074F SYST BP LT 130 MM HG: CPT | Performed by: OBSTETRICS & GYNECOLOGY

## 2024-05-08 PROCEDURE — 1036F TOBACCO NON-USER: CPT | Performed by: OBSTETRICS & GYNECOLOGY

## 2024-05-08 PROCEDURE — 3008F BODY MASS INDEX DOCD: CPT | Performed by: OBSTETRICS & GYNECOLOGY

## 2024-05-08 PROCEDURE — 3078F DIAST BP <80 MM HG: CPT | Performed by: OBSTETRICS & GYNECOLOGY

## 2024-05-08 PROCEDURE — 99395 PREV VISIT EST AGE 18-39: CPT | Performed by: OBSTETRICS & GYNECOLOGY

## 2024-05-08 PROCEDURE — 87624 HPV HI-RISK TYP POOLED RSLT: CPT

## 2024-05-08 NOTE — PROGRESS NOTES
Chief Complaint   Patient presents with    Annual Exam     New Patient: Annual Exam with Pap Smear        C/O abdominal pain especially while sitting since 24, excessive hair growth, and concern that she may have fibroids and or endometriosis.  Patient had Nexplanon placed 2021 and would like to discuss different options of birth control that may help with the hair growth.    Chaperone declined.       Patient is a  11 who presents to establish gynecologic care and annual preventative exam.  Has Nexplanon for birth control and this is due in 2024.  Patient would like to wait till then to get.  Is contemplating pregnancy but has a complicated OB history.  See below.      3 to 4 weeks ago and noticed a lot of increased heavy lifting and encountered an abdominal strain.  Since then has had pelvic pressure and discomfort.  Patient recently had STI screening at her primary care and primary care has ordered a CT of the abdomen and pelvis and an MRI of the low back.    OB history    2008, delivered at 34 weeks after spontaneous rupture membranes leading to spontaneous onset of labor.  Had threatened  labor since 26 weeks.  This was a vaginal delivery of a baby girl weighing 5 pounds.    2011, experience spontaneous rupture membranes at 29 weeks and delivered vaginally a baby girl weighing 2 pounds 12 ounces.    2018 at 31 weeks went into  labor.  1 week later underwent spontaneous rupture membranes followed by vaginal delivery of a 2 pound baby girl.     rescue cerclage at 17 weeks, miscarried spontaneously 2 weeks later.    Discussed concept of prophylactic rather than rescue cerclage.    Of note, oldest daughter passed away at age 14 due to complications of COVID.      REVIEW OF SYSTEMS    Please see HPI for reported pertinent positives, which would supersede this ROS    Constitutional:  Denies fever, chills, wt gain or loss, fatigue    Genito-Urinary:  Denies  genital lesion or sores, vaginal dryness, itching  or pain.  No abnormal vaginal discharge or unexplained vaginal bleeding.  No dysuria, urinary incontinence or frequency.  Denies pelvic pain, dysmenorrhea or dyspareunia.    Eyes:  Denies vision changes, dryness  ENT:  No hearing loss, sinus pain or congestion, nosebleeds  Cardiovascular:  No chest pain or palpitations  Respiratory:  No SOB, cough, wheezing  GI:  No Nausea, vomiting, diarrhea, constipation, abdominal pain  Musculoskeletal:  No new back pain. joint pain, peripheral edema  Skin:  No rash or skin lesion  Neurologic:  No HA, numbness or dizziness  Psychiatric:  No new anxiety or depression  Endocrine:  No loss of hair or hirsutism  Hematologic/lymphatic:  No swollen lymph nodes.  No reported tendency for easy bruising or bleeding    Patient admits to no other systemic complaints      PHYSICAL EXAM      PSYCH:  Pt is alert, oriented and cooperative    SKIN: warm, dry, w/o lesion    HEAD AND FACE:  External inspection of eyes, ears, functional cranial nerves normal and intact    THYROID:  No thyromegaly    CARDIOVASCULAR:  Warm and well Perfused    PULMONARY:  No respiratory distress    ABDOMEN:  soft, nontender.  No mass or organomegaly.      PELVIC:    External genitalia, urethra, perianal region normal to inspection and palpation if indicated.  No inguinal LA    Vagina without lesions.    Cervix seen and visually normal      Bimanual exam:      No pelvic mass palpable    Uterus nontender, midline in pelvis    No adnexal masses or tenderness    Assessment/Plan    Diagnoses and all orders for this visit:  Well woman exam with routine gynecological exam  -     THINPREP PAP TEST; Future

## 2024-05-09 PROBLEM — D50.0 IRON DEFICIENCY ANEMIA DUE TO CHRONIC BLOOD LOSS: Status: ACTIVE | Noted: 2024-05-09

## 2024-05-09 RX ORDER — ERGOCALCIFEROL 1.25 MG/1
50000 CAPSULE ORAL
Qty: 12 CAPSULE | Refills: 3 | Status: SHIPPED | OUTPATIENT
Start: 2024-05-12

## 2024-05-09 RX ORDER — FERROUS GLUCONATE 325 MG
38 TABLET ORAL
Qty: 90 TABLET | Refills: 3 | Status: SHIPPED | OUTPATIENT
Start: 2024-05-09 | End: 2025-05-09

## 2024-05-20 ENCOUNTER — APPOINTMENT (OUTPATIENT)
Dept: OTOLARYNGOLOGY | Facility: CLINIC | Age: 34
End: 2024-05-20
Payer: COMMERCIAL

## 2024-05-20 LAB
CYTOLOGY CMNT CVX/VAG CYTO-IMP: NORMAL
HPV HR 12 DNA GENITAL QL NAA+PROBE: NEGATIVE
HPV HR GENOTYPES PNL CVX NAA+PROBE: NEGATIVE
HPV16 DNA SPEC QL NAA+PROBE: NEGATIVE
HPV18 DNA SPEC QL NAA+PROBE: NEGATIVE
LAB AP CONTRACEPTIVE HISTORY: NORMAL
LAB AP HPV GENOTYPE QUESTION: YES
LAB AP HPV HR: NORMAL
LABORATORY COMMENT REPORT: NORMAL
LABORATORY COMMENT REPORT: NORMAL
PATH REPORT.TOTAL CANCER: NORMAL

## 2024-05-21 ENCOUNTER — HOSPITAL ENCOUNTER (OUTPATIENT)
Dept: RADIOLOGY | Facility: CLINIC | Age: 34
Discharge: HOME | End: 2024-05-21
Payer: COMMERCIAL

## 2024-05-21 DIAGNOSIS — H93.A2 PULSATILE TINNITUS OF LEFT EAR: ICD-10-CM

## 2024-05-22 ENCOUNTER — HOSPITAL ENCOUNTER (OUTPATIENT)
Dept: RADIOLOGY | Facility: CLINIC | Age: 34
Discharge: HOME | End: 2024-05-22
Payer: COMMERCIAL

## 2024-05-22 DIAGNOSIS — R10.30 LOWER ABDOMINAL PAIN: ICD-10-CM

## 2024-05-22 PROCEDURE — 74177 CT ABD & PELVIS W/CONTRAST: CPT

## 2024-05-22 PROCEDURE — 74177 CT ABD & PELVIS W/CONTRAST: CPT | Performed by: RADIOLOGY

## 2024-05-22 PROCEDURE — 2550000001 HC RX 255 CONTRASTS: Performed by: FAMILY MEDICINE

## 2024-05-22 RX ADMIN — IOHEXOL 75 ML: 350 INJECTION, SOLUTION INTRAVENOUS at 16:24

## 2024-05-27 DIAGNOSIS — N83.201 RIGHT OVARIAN CYST: Primary | ICD-10-CM

## 2024-05-29 PROBLEM — F40.228: Status: ACTIVE | Noted: 2024-05-29

## 2024-05-30 ENCOUNTER — APPOINTMENT (OUTPATIENT)
Dept: RADIOLOGY | Facility: CLINIC | Age: 34
End: 2024-05-30
Payer: COMMERCIAL

## 2024-06-04 ENCOUNTER — ALLIED HEALTH (OUTPATIENT)
Dept: INTEGRATIVE MEDICINE | Facility: CLINIC | Age: 34
End: 2024-06-04
Payer: COMMERCIAL

## 2024-06-04 DIAGNOSIS — M99.03 SEGMENTAL AND SOMATIC DYSFUNCTION OF LUMBAR REGION: ICD-10-CM

## 2024-06-04 DIAGNOSIS — M79.10 MYALGIA: ICD-10-CM

## 2024-06-04 DIAGNOSIS — M99.01 SEGMENTAL AND SOMATIC DYSFUNCTION OF CERVICAL REGION: Primary | ICD-10-CM

## 2024-06-04 DIAGNOSIS — M54.2 CERVICALGIA: ICD-10-CM

## 2024-06-04 DIAGNOSIS — M99.02 SEGMENTAL AND SOMATIC DYSFUNCTION OF THORACIC REGION: ICD-10-CM

## 2024-06-04 DIAGNOSIS — M54.59 MECHANICAL LOW BACK PAIN: ICD-10-CM

## 2024-06-04 PROCEDURE — 98941 CHIROPRACT MANJ 3-4 REGIONS: CPT | Performed by: CHIROPRACTOR

## 2024-06-04 NOTE — PROGRESS NOTES
Subjective   Patient ID: Dorcas Moore is a 34 y.o. female who presents June 4, 2024 for neck/upper back and low back pain.    (1/10) VPCY    Today, the patient rates their degree of pain as a 5 out of 10 on the numeric pain rating scale.     Dorcas returns for treatment of neck/upper back and low back pain. Patient states that symptoms are no different than what she presented with over a year ago. She states that neck pain starts at the base of the skull and goes down to between the shoulder blades. She states that she gets headaches about once a week. She states that the low back pain goes across the low back and into the buttock, bilaterally. She states that she will have tingling going down the lateral right thigh. Patient denies any difficulty speaking/swallowing, vision changes, bowel/bladder issues, chest pain/shortness of breath, numbness. She denies any change in medical history since last visit and will follow up in 1 week.              Objective   Physical Exam  Constitutional:       General: She is not in acute distress.     Appearance: Normal appearance.       HENT:      Head: Normocephalic and atraumatic.   Musculoskeletal:      Cervical back: Tenderness present. Decreased range of motion.      Thoracic back: Tenderness present.      Lumbar back: Tenderness present. Decreased range of motion.   Neurological:      General: No focal deficit present.      Mental Status: She is alert and oriented to person, place, and time.      Cranial Nerves: No dysarthria or facial asymmetry.      Sensory: Sensation is intact.      Motor: Motor function is intact.      Coordination: Coordination is intact.      Gait: Gait is intact.   Psychiatric:         Attention and Perception: Attention normal.         Mood and Affect: Mood normal.         Speech: Speech normal.         Behavior: Behavior is cooperative.       Palpation of the following region(s) revealed:  Cervical: Suboccipitals bilateral, hypertonicity  and tenderness.  Upper trapezius bilateral, hypertonicity and tenderness.  Levator scapulae bilateral, hypertonicity and tenderness.  Cervical paraspinals bilateral, hypertonicity and tenderness.  Thoracic: Thoracic paraspinals bilateral, hypertonicity and tenderness.  Rhomboids bilateral, hypertonicity and tenderness.  Pectoralis bilateral, hypertonicity and tenderness.  Lumbar: Lumbar paraspinals bilateral, hypertonicity and tenderness.  Quadratus lumborum right, muscular hypertonicity.        Segmental Joint(s): Segmental joint dysfunction was assessed with motion palpation and is identified in the following areas:  Cervical : C1 C5 C6  Thoracic : T2., T3, T7, and T8  Lumbopelvic / Sacral SIJ : L5 and L5/S1  Upper / Lower Extremities : R Hip and L Hip      Assessment/Plan   Today's Treatment Included: Chiropractic manipulation to the Segmental Joint(s) Cervical : C1 C5 C6 Segmental Joint(s) Lumbopelvic/Sacral SIJ : L5 and L5/S1 Segmental Joint(s) Thoracic : T2., T3, T7, and T8 Segmental Joints Upper/Lower Extremities : R Hip and L Hip  Treatment Techniques Used : Diversified CMT and Manual traction  Soft-Tissue manipulation    Soft-tissue mobilization was performed in the following areas:   Suboccipital bilateral, Cervical paraspinal mm. bilateral, Upper Trapezius bilateral, and Levator Scap. bilateral  Thoracic Paraspinal mm. bilateral, Rhomboids bilateral, and Pectoralis bilateral  Lumbar Paraspinal mm. bilateral            The patient tolerated today's treatment with little or no additional discomfort and was instructed to contact the office for questions or concerns.

## 2024-06-11 ENCOUNTER — APPOINTMENT (OUTPATIENT)
Dept: INTEGRATIVE MEDICINE | Facility: CLINIC | Age: 34
End: 2024-06-11
Payer: COMMERCIAL

## 2024-06-18 ENCOUNTER — APPOINTMENT (OUTPATIENT)
Dept: INTEGRATIVE MEDICINE | Facility: CLINIC | Age: 34
End: 2024-06-18
Payer: COMMERCIAL

## 2024-06-18 DIAGNOSIS — M99.01 SEGMENTAL AND SOMATIC DYSFUNCTION OF CERVICAL REGION: Primary | ICD-10-CM

## 2024-06-18 DIAGNOSIS — M99.02 SEGMENTAL AND SOMATIC DYSFUNCTION OF THORACIC REGION: ICD-10-CM

## 2024-06-18 DIAGNOSIS — M54.59 MECHANICAL LOW BACK PAIN: ICD-10-CM

## 2024-06-18 DIAGNOSIS — M99.03 SEGMENTAL AND SOMATIC DYSFUNCTION OF LUMBAR REGION: ICD-10-CM

## 2024-06-18 DIAGNOSIS — M54.2 CERVICALGIA: ICD-10-CM

## 2024-06-18 DIAGNOSIS — M79.10 MYALGIA: ICD-10-CM

## 2024-06-18 PROCEDURE — 98941 CHIROPRACT MANJ 3-4 REGIONS: CPT | Performed by: CHIROPRACTOR

## 2024-06-18 NOTE — PROGRESS NOTES
Subjective   Patient ID: Dorcas Moore is a 34 y.o. female who presents June 18, 2024 for neck/upper back and low back pain.    (2/10) VPCY    Today, the patient rates their degree of pain as a 4 out of 10 on the numeric pain rating scale.     Dorcas returns for treatment of neck/upper back and low back pain. Patient states that she had increased soreness for a day or so after last visit. She states that after increased soreness dissipated, she had improvement in symptoms that lasted until today. Denies any associated symptoms or change in medical history since last visit and will follow up in 1 week.              Objective   Physical Exam  Constitutional:       General: She is not in acute distress.     Appearance: Normal appearance.       HENT:      Head: Normocephalic and atraumatic.   Musculoskeletal:      Cervical back: Tenderness present. Decreased range of motion.      Thoracic back: Tenderness present.      Lumbar back: Tenderness present. Decreased range of motion.   Neurological:      General: No focal deficit present.      Mental Status: She is alert and oriented to person, place, and time.      Cranial Nerves: No dysarthria or facial asymmetry.      Sensory: Sensation is intact.      Motor: Motor function is intact.      Coordination: Coordination is intact.      Gait: Gait is intact.   Psychiatric:         Attention and Perception: Attention normal.         Mood and Affect: Mood normal.         Speech: Speech normal.         Behavior: Behavior is cooperative.       Palpation of the following region(s) revealed:  Cervical: Suboccipitals bilateral, hypertonicity and tenderness.  Upper trapezius bilateral, hypertonicity and tenderness.  Levator scapulae bilateral, hypertonicity and tenderness.  Cervical paraspinals bilateral, hypertonicity and tenderness.  Thoracic: Thoracic paraspinals bilateral, hypertonicity and tenderness.  Rhomboids bilateral, hypertonicity and tenderness.  Pectoralis  bilateral, hypertonicity and tenderness.  Lumbar: Lumbar paraspinals bilateral, hypertonicity and tenderness.  Quadratus lumborum right, muscular hypertonicity.        Segmental Joint(s): Segmental joint dysfunction was assessed with motion palpation and is identified in the following areas:  Cervical : C1 C5 C6  Thoracic : T2., T3, T7, and T8  Lumbopelvic / Sacral SIJ : L5 and L5/S1  Upper / Lower Extremities : R Hip and L Hip      Assessment/Plan   Today's Treatment Included: Chiropractic manipulation to the Segmental Joint(s) Cervical : C1 C5 C6 Segmental Joint(s) Lumbopelvic/Sacral SIJ : L5 and L5/S1 Segmental Joint(s) Thoracic : T2., T3, T7, and T8 Segmental Joints Upper/Lower Extremities : R Hip and L Hip  Treatment Techniques Used : Diversified CMT and Manual traction  Soft-Tissue manipulation    Soft-tissue mobilization was performed in the following areas:   Suboccipital bilateral, Cervical paraspinal mm. bilateral, Upper Trapezius bilateral, and Levator Scap. bilateral  Thoracic Paraspinal mm. bilateral, Rhomboids bilateral, and Pectoralis bilateral  Lumbar Paraspinal mm. bilateral            The patient tolerated today's treatment with little or no additional discomfort and was instructed to contact the office for questions or concerns.

## 2024-06-25 ENCOUNTER — APPOINTMENT (OUTPATIENT)
Dept: INTEGRATIVE MEDICINE | Facility: CLINIC | Age: 34
End: 2024-06-25
Payer: COMMERCIAL

## 2024-07-01 ENCOUNTER — HOSPITAL ENCOUNTER (OUTPATIENT)
Dept: RADIOLOGY | Facility: CLINIC | Age: 34
Discharge: HOME | End: 2024-07-01
Payer: COMMERCIAL

## 2024-07-01 DIAGNOSIS — N83.201 RIGHT OVARIAN CYST: ICD-10-CM

## 2024-07-09 ENCOUNTER — APPOINTMENT (OUTPATIENT)
Dept: RADIOLOGY | Facility: CLINIC | Age: 34
End: 2024-07-09
Payer: COMMERCIAL

## 2024-07-11 ENCOUNTER — HOSPITAL ENCOUNTER (OUTPATIENT)
Dept: RADIOLOGY | Facility: CLINIC | Age: 34
End: 2024-07-11
Payer: COMMERCIAL

## 2024-07-12 ENCOUNTER — HOSPITAL ENCOUNTER (OUTPATIENT)
Dept: RADIOLOGY | Facility: CLINIC | Age: 34
Discharge: HOME | End: 2024-07-12
Payer: COMMERCIAL

## 2024-07-12 PROCEDURE — 76830 TRANSVAGINAL US NON-OB: CPT

## 2024-07-17 ENCOUNTER — TELEPHONE (OUTPATIENT)
Dept: PRIMARY CARE | Facility: CLINIC | Age: 34
End: 2024-07-17
Payer: COMMERCIAL

## 2024-07-30 ENCOUNTER — APPOINTMENT (OUTPATIENT)
Dept: OBSTETRICS AND GYNECOLOGY | Facility: CLINIC | Age: 34
End: 2024-07-30
Payer: COMMERCIAL

## 2024-07-30 VITALS — WEIGHT: 263 LBS | BODY MASS INDEX: 45.14 KG/M2 | DIASTOLIC BLOOD PRESSURE: 83 MMHG | SYSTOLIC BLOOD PRESSURE: 130 MMHG

## 2024-07-30 DIAGNOSIS — G89.29 CHRONIC ABDOMINAL PAIN: ICD-10-CM

## 2024-07-30 DIAGNOSIS — R10.9 CHRONIC ABDOMINAL PAIN: ICD-10-CM

## 2024-07-30 DIAGNOSIS — N83.209 CYST OF OVARY, UNSPECIFIED LATERALITY: Primary | ICD-10-CM

## 2024-07-30 DIAGNOSIS — Z30.46 ENCOUNTER FOR SURVEILLANCE OF NEXPLANON SUBDERMAL CONTRACEPTIVE: ICD-10-CM

## 2024-07-30 PROCEDURE — 3079F DIAST BP 80-89 MM HG: CPT | Performed by: STUDENT IN AN ORGANIZED HEALTH CARE EDUCATION/TRAINING PROGRAM

## 2024-07-30 PROCEDURE — 1036F TOBACCO NON-USER: CPT | Performed by: STUDENT IN AN ORGANIZED HEALTH CARE EDUCATION/TRAINING PROGRAM

## 2024-07-30 PROCEDURE — 3075F SYST BP GE 130 - 139MM HG: CPT | Performed by: STUDENT IN AN ORGANIZED HEALTH CARE EDUCATION/TRAINING PROGRAM

## 2024-07-30 PROCEDURE — 99214 OFFICE O/P EST MOD 30 MIN: CPT | Performed by: STUDENT IN AN ORGANIZED HEALTH CARE EDUCATION/TRAINING PROGRAM

## 2024-07-30 RX ORDER — IBUPROFEN 600 MG/1
600 TABLET ORAL EVERY 6 HOURS PRN
Qty: 100 TABLET | Refills: 2 | Status: SHIPPED | OUTPATIENT
Start: 2024-07-30 | End: 2025-07-30

## 2024-07-30 RX ORDER — ACETAMINOPHEN 500 MG
1000 TABLET ORAL EVERY 6 HOURS PRN
Qty: 100 TABLET | Refills: 0 | Status: SHIPPED | OUTPATIENT
Start: 2024-07-30 | End: 2025-07-30

## 2024-07-30 RX ORDER — CYCLOBENZAPRINE HCL 5 MG
5 TABLET ORAL 3 TIMES DAILY PRN
Qty: 30 TABLET | Refills: 0 | Status: SHIPPED | OUTPATIENT
Start: 2024-07-30 | End: 2024-09-28

## 2024-07-30 ASSESSMENT — ENCOUNTER SYMPTOMS
ABDOMINAL PAIN: 1
DEPRESSION: 0
LOSS OF SENSATION IN FEET: 0
OCCASIONAL FEELINGS OF UNSTEADINESS: 0

## 2024-07-30 ASSESSMENT — PAIN SCALES - GENERAL: PAINLEVEL: 0-NO PAIN

## 2024-07-30 NOTE — PROGRESS NOTES
Subjective   Patient ID: Dorcas Moore is a 34 y.o. female who presents for Consult (Pt here to discuss recent diagnosis of ovarian cysts/Ultrasound done on 7/12/2024 and 5/22/2024).  Patient referred for evaluation of adnexal mass.  Patient is reporting several months of intermittent abdominal pain related to lifting heavy objects such as luggage.        Review of Systems   Gastrointestinal:  Positive for abdominal pain.       Objective   Physical Exam  Vitals reviewed.   Constitutional:       General: She is not in acute distress.     Appearance: She is not ill-appearing.   Pulmonary:      Effort: Pulmonary effort is normal.   Abdominal:      General: There is no distension.      Palpations: Abdomen is soft. There is no mass.      Tenderness: There is abdominal tenderness. There is no guarding or rebound.      Comments: Positive Carnett's sign   Skin:     Coloration: Skin is not pale.   Neurological:      Mental Status: She is alert.         Assessment/Plan   Diagnoses and all orders for this visit:  Cyst of ovary, unspecified laterality  Chronic abdominal pain  -     ibuprofen 600 mg tablet; Take 1 tablet (600 mg) by mouth every 6 hours if needed for mild pain (1 - 3).  -     acetaminophen (Tylenol Extra Strength) 500 mg tablet; Take 2 tablets (1,000 mg) by mouth every 6 hours if needed for mild pain (1 - 3).  -     cyclobenzaprine (Flexeril) 5 mg tablet; Take 1 tablet (5 mg) by mouth 3 times a day as needed for muscle spasms.    Ultrasound images reviewed.  There is a small benign-appearing cyst on the left ovary.  And her pain symptoms that appear to be unrelated to the pelvis.  Follow-up is not indicated unless patient becomes symptomatic.  Regarding her abdominal pain given the presence of Carnett's sign suspect that this is due to muscle strain.  Recommend Tylenol and Motrin as well as warm packs and ice as patient has been doing.  Will also send prescription for Flexeril as MSK pain responds well to  muscle relaxant.  Patient to follow-up in 3 months for symptom check.     Patient also with Nexplanon contraceptive implant in place that she is not happy with.  It is nearly years old.  She is bothered by the unpredictable bleeding pattern.  Offered removal at a separate visit.  Patient is amenable to Nexplanon removal and replacement likely with levonorgestrel IUD.       Juan Francisco Webb MD 07/30/24 5:32 PM

## 2024-08-29 ENCOUNTER — APPOINTMENT (OUTPATIENT)
Dept: SURGERY | Facility: CLINIC | Age: 34
End: 2024-08-29
Payer: COMMERCIAL

## 2024-10-10 ENCOUNTER — APPOINTMENT (OUTPATIENT)
Dept: OTOLARYNGOLOGY | Facility: CLINIC | Age: 34
End: 2024-10-10
Payer: COMMERCIAL

## 2024-10-10 ENCOUNTER — CLINICAL SUPPORT (OUTPATIENT)
Dept: AUDIOLOGY | Facility: CLINIC | Age: 34
End: 2024-10-10
Payer: COMMERCIAL

## 2024-10-10 VITALS — WEIGHT: 257 LBS | BODY MASS INDEX: 43.87 KG/M2 | TEMPERATURE: 98.3 F | HEIGHT: 64 IN

## 2024-10-10 DIAGNOSIS — H93.12 TINNITUS OF LEFT EAR: Primary | ICD-10-CM

## 2024-10-10 DIAGNOSIS — H93.13 TINNITUS OF BOTH EARS: Primary | ICD-10-CM

## 2024-10-10 PROCEDURE — 3008F BODY MASS INDEX DOCD: CPT | Performed by: NURSE PRACTITIONER

## 2024-10-10 PROCEDURE — 99204 OFFICE O/P NEW MOD 45 MIN: CPT | Performed by: NURSE PRACTITIONER

## 2024-10-10 PROCEDURE — 92557 COMPREHENSIVE HEARING TEST: CPT | Performed by: AUDIOLOGIST

## 2024-10-10 PROCEDURE — 92567 TYMPANOMETRY: CPT | Performed by: AUDIOLOGIST

## 2024-10-10 ASSESSMENT — PATIENT HEALTH QUESTIONNAIRE - PHQ9
SUM OF ALL RESPONSES TO PHQ9 QUESTIONS 1 AND 2: 0
1. LITTLE INTEREST OR PLEASURE IN DOING THINGS: NOT AT ALL
2. FEELING DOWN, DEPRESSED OR HOPELESS: NOT AT ALL

## 2024-10-10 NOTE — PROGRESS NOTES
Chief Complaint   Patient presents with    Tinnitus       HISTORY:  Dorcas Moore, age 34 years, was seen for audiogram in conjunction with otolaryngology appointment on 10/10/2024.  Ms. Moore reports bilateral high frequency tinnitus and left sided whooshing tinnitus.  She was last tested in 2021 with normal hearing noted both ears.  She denies ear pain, ear pressure and middle ear pathology,  She had bilateral pressure equalization tubes placed as a child.     RESULTS:  Prior to testing both external auditory canals were clear and tympanic membranes visualized    Immittance and acoustic reflexes:  Immittance testing yielded TYPE A tympanograms indicating normal middle ear function both ears  Acoustic reflexes were present 500 - 4000 Hz both ears    Audiogram:  Normal hearing levels were obtained 125 - 8000 Hz both ears  Speech reception thresholds obtained at 5 dBHL both ears  Speech discrimination scores were 100% at 50 dBHL    Distortion product otoacoustic emissions:  Robust emissions were obtained 2000 -8000 Hz both ears    IMPRESSIONS:  Normal middle ear function noted both ears  Normal acoustic reflexes noted both ears  Robust distortion product otoacoustic emissions indicate normal cochlear function both ears  Normal hearing levels both ears    RECOMMENDATIONS:  1.  Follow up with otolaryngology  2.  Retest hearing levels annually    time: 4074 - 6184

## 2024-10-10 NOTE — PROGRESS NOTES
Subjective   Patient ID: Dorcas Moore is a 34 y.o. female who presents for Tinnitus (Left ear ).  HPI  Onset 2019 she has left ear swooshing sounds more pronounced with physical exertion and when laying down in bed or sitting in quiet room. This has become more noticeable as she gained more weight overtime.   No dizziness, vertigo or headaches. No diplopia. Wears prescription glasses since 2012  Ear infections during childhood. TM rupture in 2021 treated with topical drops. Resolved TM rupture.   Admits to periodic sinus issues possibly related to environmental allergies. She uses saline for sinus congestion or drainage.  No family history of ear disease. 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   She will be undergoing bariatric surgery at the end of the month. She  wants an ENT consult prior to proceeding with  PCP recommended  imaging such as CTA and MRA.  She wants to prioritize allocation of funds since she has an upcoming major surgery.          Audiogram this is is my personal interpretation: Normal hearing across all frequencies bilateral ears. Normal tympanogram bilateral ears. Excellent Word discrimination both ears and  acoustic reflex present right and left ipsilateral.      Review of Systems    All other systems have been reviewed and are negative for complaints except for those mentioned in history of present illness, past medical history and problem list       Objective   Physical Exam    CONSTITUTIONAL: No acute distress, normal facial features; No fever; no chills  VOICE: No hoarseness or other audible abnormality  RESPIRATION: Breathing comfortably, no stridor; normal breathing effort  CV: No cyanosis visible on the face and neck area  EYES:Pupils equal and round ; no erythema; conjunctiva clear; sclera white  NEURO: Alert and oriented, able to raise eyebrows symmetrical bilateral, smile with no facial droop, able to swallow  HEAD AND FACE: Symmetric facial features, no  masses or lesions    Right ear examination: External ear normal. EAC clear. TM intact without effusion, retraction or perforation.  Left ear examination: External ear normal. EAC clear. TM intact without effusion, retraction or perforation.    NOSE: External nose midline; inferior nasal turbinates normal no mucopus or polyps.  ORAL CAVITY: No lesions of external lips; uvula is midline; tongue with good mobility; no gross mass in oral cavity; mucosa appears pink   NECK/LYMPH: No obvious deformity or lesions; trachea is midline  PSYCH: Alert and oriented with appropriate mood and affect      Assessment/Plan     1. Tinnitus of left ear          Her clinical exam today showed no evidence of glomus, middle ear effusion, cerumen obstruction or debris on tympanic membrane.  Her audiogram is normal.     We discussed various etiologies of tinnitus. In her case,  the left sided tinnitus with its pulsatile quality could be attributed but not limited  to  prominent sigmoid sinus, empty sella or IIH.  I agree with work up of CTA, MRA and MRI IAC but considering her HPI,  I believe that this is more of a benign case of  blood turbulence in the prominent  sigmoid sinus. If we consider IIH, weight loss will help and she is already set to undergo bariatric  surgery therefore, it may resolve once the  weight loss happen.    Although I agree that imaging work up would be best to rule out the above conditions, this is not of an urgent case.  We have the option of  considering the radiology work up after her  bariatric surgery. Patient agreed to this plan and she  stated that she felt comfortable proceeding with it.                 ADAIR Zavala 10/10/24 3:07 PM

## 2024-10-31 ENCOUNTER — APPOINTMENT (OUTPATIENT)
Dept: OBSTETRICS AND GYNECOLOGY | Facility: CLINIC | Age: 34
End: 2024-10-31
Payer: COMMERCIAL

## 2024-11-12 ENCOUNTER — APPOINTMENT (OUTPATIENT)
Dept: OBSTETRICS AND GYNECOLOGY | Facility: CLINIC | Age: 34
End: 2024-11-12
Payer: COMMERCIAL

## 2024-11-12 VITALS — SYSTOLIC BLOOD PRESSURE: 121 MMHG | BODY MASS INDEX: 44.63 KG/M2 | DIASTOLIC BLOOD PRESSURE: 79 MMHG | WEIGHT: 260 LBS

## 2024-11-12 DIAGNOSIS — Z97.5 BREAKTHROUGH BLEEDING ON NEXPLANON: Primary | ICD-10-CM

## 2024-11-12 DIAGNOSIS — Z30.09 CONTRACEPTIVE EDUCATION: ICD-10-CM

## 2024-11-12 DIAGNOSIS — Z72.51: ICD-10-CM

## 2024-11-12 DIAGNOSIS — N92.1 BREAKTHROUGH BLEEDING ON NEXPLANON: Primary | ICD-10-CM

## 2024-11-12 PROCEDURE — 3074F SYST BP LT 130 MM HG: CPT | Performed by: STUDENT IN AN ORGANIZED HEALTH CARE EDUCATION/TRAINING PROGRAM

## 2024-11-12 PROCEDURE — 3078F DIAST BP <80 MM HG: CPT | Performed by: STUDENT IN AN ORGANIZED HEALTH CARE EDUCATION/TRAINING PROGRAM

## 2024-11-12 PROCEDURE — 99213 OFFICE O/P EST LOW 20 MIN: CPT | Performed by: STUDENT IN AN ORGANIZED HEALTH CARE EDUCATION/TRAINING PROGRAM

## 2024-11-12 PROCEDURE — 1036F TOBACCO NON-USER: CPT | Performed by: STUDENT IN AN ORGANIZED HEALTH CARE EDUCATION/TRAINING PROGRAM

## 2024-11-12 ASSESSMENT — PAIN SCALES - GENERAL: PAINLEVEL_OUTOF10: 0-NO PAIN

## 2024-11-12 ASSESSMENT — ENCOUNTER SYMPTOMS
LOSS OF SENSATION IN FEET: 0
OCCASIONAL FEELINGS OF UNSTEADINESS: 0
DEPRESSION: 0

## 2024-11-12 NOTE — PROGRESS NOTES
Subjective   Patient ID: Dorcas Moore is a 34 y.o. female who presents for Consult (Pt here to follow up with birth control and discuss replacement of nexplanon/Pt unsure of what form she would like to use/Pt c/o increase pain and flow each menses).  Reporting new onset breakthrough bleeding with Nexplanon which has been in place for 3 years.  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  Also reports multiple sexual partners and wonders about appropriate STI screening/risk of vaginitis        Review of Systems   All other systems reviewed and are negative.      Objective   Physical Exam  Vitals reviewed.   Constitutional:       General: She is not in acute distress.     Appearance: She is not ill-appearing.   Pulmonary:      Effort: Pulmonary effort is normal.   Skin:     Coloration: Skin is not pale.   Neurological:      Mental Status: She is alert.         Assessment/Plan   Diagnoses and all orders for this visit:  Breakthrough bleeding on Nexplanon  Risk for sexually transmitted infection  Contraceptive education    Patient reporting breakthrough bleeding on Nexplanon which has been in place for 3 years..  Counseled patient on various options for contraception which would not be affected by GI malabsorption secondary to Alis-en-Y gastric bypass.  Patient would like Mirena IUD.  Patient to make an appointment for Nexplanon removal and Mirena IUD placement.  Patient was also counseled about the need for routine screening for STIs given multiple sexual partners.       Jaun Francisco Webb MD 11/12/24 4:53 PM

## 2024-11-26 ENCOUNTER — APPOINTMENT (OUTPATIENT)
Dept: OBSTETRICS AND GYNECOLOGY | Facility: CLINIC | Age: 34
End: 2024-11-26
Payer: COMMERCIAL

## 2024-11-26 VITALS — DIASTOLIC BLOOD PRESSURE: 83 MMHG | WEIGHT: 261.4 LBS | BODY MASS INDEX: 44.87 KG/M2 | SYSTOLIC BLOOD PRESSURE: 129 MMHG

## 2024-11-26 DIAGNOSIS — Z30.430 ENCOUNTER FOR IUD INSERTION: ICD-10-CM

## 2024-11-26 DIAGNOSIS — R10.2 FEMALE PELVIC PAIN: Primary | ICD-10-CM

## 2024-11-26 DIAGNOSIS — Z30.46 ENCOUNTER FOR NEXPLANON REMOVAL: ICD-10-CM

## 2024-11-26 PROCEDURE — 11982 REMOVE DRUG IMPLANT DEVICE: CPT | Performed by: STUDENT IN AN ORGANIZED HEALTH CARE EDUCATION/TRAINING PROGRAM

## 2024-11-26 PROCEDURE — 58300 INSERT INTRAUTERINE DEVICE: CPT | Performed by: STUDENT IN AN ORGANIZED HEALTH CARE EDUCATION/TRAINING PROGRAM

## 2024-11-26 RX ORDER — IBUPROFEN 600 MG/1
600 TABLET ORAL EVERY 6 HOURS PRN
Qty: 100 TABLET | Refills: 2 | Status: SHIPPED | OUTPATIENT
Start: 2024-11-26 | End: 2025-11-26

## 2024-11-26 ASSESSMENT — PAIN SCALES - GENERAL: PAINLEVEL_OUTOF10: 0-NO PAIN

## 2024-11-26 NOTE — PROGRESS NOTES
Patient ID: Dorcas Moore is a 34 y.o. female.    Nexplanon removal    Date/Time: 11/26/2024 5:11 PM    Performed by: Juan Francisco Webb MD  Authorized by: Juan Francisco Webb MD    Consent:     Consent obtained:  Verbal and written    Consent given by:  Patient    Procedural risks discussed:  Bleeding, damage to other organs, death and infection    Patient questions answered: yes      Patient agrees, verbalizes understanding, and wants to proceed: yes    Indication:     Indication: Presence of non-biodegradable drug delivery implant    Pre-procedure:     Local anesthetic:  Lidocaine with epinephrine    The site was cleaned and prepped in a sterile fashion: yes    Procedure:     Procedure:  Removal    Small stab incision was made in arm: yes      Left/right:  Left    Visualization of implant was obtained: yes      Site was closed with steri-strips and pressure bandage applied: yes    Comments:      Uncomplicated nexplanon removal  IUD Insertion    Performed by: Juan Francisco Webb MD  Authorized by: Juan Francisco Webb MD    Procedure: IUD insertion    Consent obtained by patient, parent, or legal power of  - including discussion of procedure risks and benefits, patient questions answered, and patient education provided: yes    Pregnancy risk: reasonably certain the patient is not pregnant    Pelvic exam performed: yes    Speculum placed in vagina: yes    Tenaculum/Allis/Ring Forceps applied to cervix: yes    Uterus sound depth (cm):  7  Cervix dilated: yes    Cervix dilated with:  Cervical os finder  IUD inserted without complications: yes    OSM: 52 mg levonorgestrel 20 mcg/24hr  Strings trimmed to (cm):  2  Patient tolerated procedure well: yes    Intended removal date: 8 years    Insertion comments: Uncomplicated IUD placement with significant pain. US ordered to assess for pelvic pathology and confirm proper placement.

## 2024-11-27 ENCOUNTER — HOSPITAL ENCOUNTER (OUTPATIENT)
Dept: RADIOLOGY | Facility: CLINIC | Age: 34
Discharge: HOME | End: 2024-11-27
Payer: COMMERCIAL

## 2024-11-27 DIAGNOSIS — R10.2 FEMALE PELVIC PAIN: ICD-10-CM

## 2025-02-13 ENCOUNTER — HOSPITAL ENCOUNTER (OUTPATIENT)
Dept: RADIOLOGY | Facility: CLINIC | Age: 35
Discharge: HOME | End: 2025-02-13
Payer: COMMERCIAL

## 2025-02-13 ENCOUNTER — OFFICE VISIT (OUTPATIENT)
Dept: URGENT CARE | Age: 35
End: 2025-02-13
Payer: COMMERCIAL

## 2025-02-13 VITALS
BODY MASS INDEX: 38.96 KG/M2 | OXYGEN SATURATION: 96 % | TEMPERATURE: 98.3 F | SYSTOLIC BLOOD PRESSURE: 116 MMHG | WEIGHT: 227 LBS | DIASTOLIC BLOOD PRESSURE: 82 MMHG | HEART RATE: 83 BPM | RESPIRATION RATE: 20 BRPM

## 2025-02-13 DIAGNOSIS — S33.5XXA LUMBAR SPRAIN, INITIAL ENCOUNTER: ICD-10-CM

## 2025-02-13 DIAGNOSIS — S63.501A WRIST SPRAIN, RIGHT, INITIAL ENCOUNTER: ICD-10-CM

## 2025-02-13 DIAGNOSIS — S80.02XA CONTUSION OF LEFT KNEE, INITIAL ENCOUNTER: Primary | ICD-10-CM

## 2025-02-13 DIAGNOSIS — S80.01XA CONTUSION OF RIGHT KNEE, INITIAL ENCOUNTER: ICD-10-CM

## 2025-02-13 DIAGNOSIS — S80.02XA CONTUSION OF LEFT KNEE, INITIAL ENCOUNTER: ICD-10-CM

## 2025-02-13 PROCEDURE — 72100 X-RAY EXAM L-S SPINE 2/3 VWS: CPT

## 2025-02-13 PROCEDURE — 73110 X-RAY EXAM OF WRIST: CPT | Mod: RT

## 2025-02-13 PROCEDURE — 73564 X-RAY EXAM KNEE 4 OR MORE: CPT | Mod: 50

## 2025-02-13 RX ORDER — CYCLOBENZAPRINE HCL 5 MG
5 TABLET ORAL NIGHTLY
Qty: 10 TABLET | Refills: 0 | Status: SHIPPED | OUTPATIENT
Start: 2025-02-13 | End: 2025-02-23

## 2025-02-13 RX ORDER — ACETAMINOPHEN 160 MG/5ML
640 LIQUID ORAL ONCE
Status: COMPLETED | OUTPATIENT
Start: 2025-02-13 | End: 2025-02-13

## 2025-02-13 RX ADMIN — ACETAMINOPHEN 640 MG: 160 LIQUID ORAL at 09:03

## 2025-02-13 ASSESSMENT — PAIN SCALES - GENERAL: PAINLEVEL_OUTOF10: 6

## 2025-02-13 NOTE — PROGRESS NOTES
Pt states that she was on her way to work this morning and slipped on ice in the parking lot and fell forward on her knees and tried to brace the fall with her arms.  Pt states that her right knee hit the pavement first and she has abrasion on the knee. Pt did not hit her head.  Pt states that she did not fall on her abdomen, but she had gastric bypass on 1/10 and is worried about incisions because she started having pain in the incision area on the right side.  Pt denies n/v.  No numbness/weakness in ue/le.  Pt is right handed.  Pt also states that she has pain in her lower back.               Physical Exam  Constitutional:       Appearance: Normal appearance.   HENT:      Head: Normocephalic and atraumatic.      Nose: Nose normal.   Eyes:      Extraocular Movements: Extraocular movements intact.      Conjunctiva/sclera: Conjunctivae normal.      Pupils: Pupils are equal, round, and reactive to light.   Cardiovascular:      Rate and Rhythm: Normal rate and regular rhythm.   Pulmonary:      Effort: Pulmonary effort is normal.   Abdominal:      General: Bowel sounds are normal.      Palpations: Abdomen is soft.      Comments: Well healed incisions on the upper and mid abdomen w/o any sign of wound dehiscence   Musculoskeletal:         General: Tenderness present.      Cervical back: Normal range of motion. No rigidity or tenderness.      Comments: +tndop over right knee anteriorly and mild swelling over knee; +tndop over left anterior knee w/o swelling  +pain with right knee flexion/extension  Preserved ROM in the left knee  +tndop over lumbar paraspinous muscles  +pain with back rotation/flexion/extension  +tndop over right wrist palmar region on the ulnar side w/o swelling/bruising  No tndop over left wrist   FROM in the right wrist/elbow/shoulder  FROM in the left wrist/elbow/shoulder   Skin:     Capillary Refill: Capillary refill takes less than 2 seconds.      Comments: Superficial abrasion on the right  anterior knee   Neurological:      General: No focal deficit present.      Mental Status: She is alert and oriented to person, place, and time.   Psychiatric:         Mood and Affect: Mood normal.         Behavior: Behavior normal.     Results:   BL knee xray:initial read no fx  Right wrist xray:initial read no fx  Lumbar spine xray:initial read no fx  Procedure: right knee abrasion cleaned with hibacleanse and bacitracin dressing applied     A/P:   BL knee contusion  Right wrist sprain  Lumbar sprain  We will call you with xray results if you need further treatment.  Ice.  Elevate.  Rest.  Wrap.  Tylenol as needed for pain. Biofreeze. Use Bacitracin on knee abrasion. Follow up with orthopedist for further evaluation in 2 weeks if symptoms persist.  Keep a diary of symptoms.  Go to the ER if starts getting worse.         Patient disposition: Home    See [employer]-14 form

## 2025-02-26 ENCOUNTER — APPOINTMENT (OUTPATIENT)
Dept: OBSTETRICS AND GYNECOLOGY | Facility: CLINIC | Age: 35
End: 2025-02-26
Payer: COMMERCIAL

## 2025-05-20 ENCOUNTER — APPOINTMENT (OUTPATIENT)
Dept: PRIMARY CARE | Facility: CLINIC | Age: 35
End: 2025-05-20
Payer: COMMERCIAL

## 2025-05-20 VITALS
DIASTOLIC BLOOD PRESSURE: 71 MMHG | HEART RATE: 73 BPM | SYSTOLIC BLOOD PRESSURE: 108 MMHG | WEIGHT: 221 LBS | TEMPERATURE: 98 F | BODY MASS INDEX: 37.93 KG/M2

## 2025-05-20 DIAGNOSIS — E11.22 TYPE 2 DIABETES MELLITUS WITH DIABETIC CHRONIC KIDNEY DISEASE, UNSPECIFIED CKD STAGE, UNSPECIFIED WHETHER LONG TERM INSULIN USE: ICD-10-CM
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
## 2025-05-20 RX ORDER — FLUCONAZOLE 150 MG/1
150 TABLET ORAL ONCE
Qty: 1 TABLET | Refills: 0 | Status: SHIPPED | OUTPATIENT
Start: 2025-05-20 | End: 2025-05-20

## 2025-05-20 RX ORDER — AMOXICILLIN AND CLAVULANATE POTASSIUM 875; 125 MG/1; MG/1
875 TABLET, FILM COATED ORAL 2 TIMES DAILY
Qty: 14 TABLET | Refills: 0 | Status: SHIPPED | OUTPATIENT
Start: 2025-05-20

## 2025-05-20 ASSESSMENT — PATIENT HEALTH QUESTIONNAIRE - PHQ9
2. FEELING DOWN, DEPRESSED OR HOPELESS: NOT AT ALL
SUM OF ALL RESPONSES TO PHQ9 QUESTIONS 1 & 2: 0
1. LITTLE INTEREST OR PLEASURE IN DOING THINGS: NOT AT ALL
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
## 2025-05-20 NOTE — ASSESSMENT & PLAN NOTE
-Continue local skin care.  -Augmentin 875 mg mg BID started today.   -Tdap vaccine last in 2018. Tdap given today.

## 2025-06-25 ENCOUNTER — OFFICE VISIT (OUTPATIENT)
Dept: URGENT CARE | Age: 35
End: 2025-06-25
Payer: COMMERCIAL

## 2025-06-25 VITALS
SYSTOLIC BLOOD PRESSURE: 112 MMHG | HEART RATE: 68 BPM | TEMPERATURE: 98 F | RESPIRATION RATE: 16 BRPM | DIASTOLIC BLOOD PRESSURE: 74 MMHG | OXYGEN SATURATION: 95 %

## 2025-06-25 DIAGNOSIS — Z87.42 HISTORY OF OVARIAN CYST: Primary | ICD-10-CM

## 2025-06-25 DIAGNOSIS — R10.30 LOWER ABDOMINAL PAIN: ICD-10-CM

## 2025-06-25 LAB
POC APPEARANCE, URINE: CLEAR
POC BILIRUBIN, URINE: NEGATIVE
POC BLOOD, URINE: NEGATIVE
POC COLOR, URINE: ABNORMAL
POC GLUCOSE, URINE: NEGATIVE MG/DL
POC KETONES, URINE: ABNORMAL MG/DL
POC LEUKOCYTES, URINE: NEGATIVE
POC NITRITE,URINE: NEGATIVE
POC PH, URINE: 6 PH
POC PROTEIN, URINE: NEGATIVE MG/DL
POC SPECIFIC GRAVITY, URINE: 1.02
POC UROBILINOGEN, URINE: 0.2 EU/DL
PREGNANCY TEST URINE, POC: NEGATIVE

## 2025-06-25 PROCEDURE — 3074F SYST BP LT 130 MM HG: CPT | Performed by: FAMILY MEDICINE

## 2025-06-25 PROCEDURE — 99213 OFFICE O/P EST LOW 20 MIN: CPT | Performed by: FAMILY MEDICINE

## 2025-06-25 PROCEDURE — 3078F DIAST BP <80 MM HG: CPT | Performed by: FAMILY MEDICINE

## 2025-06-25 PROCEDURE — 81003 URINALYSIS AUTO W/O SCOPE: CPT | Performed by: FAMILY MEDICINE

## 2025-06-25 PROCEDURE — 81025 URINE PREGNANCY TEST: CPT | Performed by: FAMILY MEDICINE

## 2025-06-25 RX ORDER — URSODIOL 300 MG/1
300 CAPSULE ORAL 2 TIMES DAILY
COMMUNITY

## 2025-06-25 RX ORDER — PANTOPRAZOLE SODIUM 40 MG/1
40 TABLET, DELAYED RELEASE ORAL DAILY
COMMUNITY

## 2025-06-25 NOTE — PROGRESS NOTES
HPI:  Patient states that for the past 3 days she has pain in her lower abdomen on the right side that radiates to the back bl.  Pt sates that pain is intermittent.  Increases with certain movements.  Pt denies n/v.  No diarrhea or constipation.  No urinary symptoms.  No unusual vaginal discharge.  Pt states that she was taking tylenol with some decrease in pain, but no significant improvement.  Pt can't take NSAIDs secondary to gastric bypass.  Pt states that her last cycle ended a week ago.  Pt states that she had ovarian cysts in the past and symptoms were similar.        ROS:  No fever/chills  No n/v  No diarrhea  No change in appetite  No urinary symptoms    PE:    A&O x3  NCAT  No conjunctival erythema  RRR  CTAB  Abd:soft, nd, +tndop in lower abdomen bl R>L w/o rebound or guarding,+bs, no cva tndop  MOEx4  No focal deficit  Judgement normal    Results:  Urine preg:-  UA: no leuk/wbc/nitrates    A/P:   Abdominal pain  History of ovarian cyst    Your pain is likely secondary to cyst related pain.  Increase fluids.  Avoid spicy/fatty/acidic food.  Keep a diary of symptoms.  Recheck with your gynecologist a few days if no improvement.  Go to the ER if starts getting worse.

## 2025-06-27 ENCOUNTER — OFFICE VISIT (OUTPATIENT)
Dept: OBSTETRICS AND GYNECOLOGY | Facility: CLINIC | Age: 35
End: 2025-06-27
Payer: COMMERCIAL

## 2025-06-27 VITALS — WEIGHT: 192.2 LBS | DIASTOLIC BLOOD PRESSURE: 76 MMHG | BODY MASS INDEX: 32.99 KG/M2 | SYSTOLIC BLOOD PRESSURE: 121 MMHG

## 2025-06-27 DIAGNOSIS — R10.2 FEMALE PELVIC PAIN: Primary | ICD-10-CM

## 2025-06-27 PROCEDURE — 3078F DIAST BP <80 MM HG: CPT | Performed by: STUDENT IN AN ORGANIZED HEALTH CARE EDUCATION/TRAINING PROGRAM

## 2025-06-27 PROCEDURE — 3074F SYST BP LT 130 MM HG: CPT | Performed by: STUDENT IN AN ORGANIZED HEALTH CARE EDUCATION/TRAINING PROGRAM

## 2025-06-27 PROCEDURE — 99214 OFFICE O/P EST MOD 30 MIN: CPT | Performed by: STUDENT IN AN ORGANIZED HEALTH CARE EDUCATION/TRAINING PROGRAM

## 2025-06-27 PROCEDURE — 1036F TOBACCO NON-USER: CPT | Performed by: STUDENT IN AN ORGANIZED HEALTH CARE EDUCATION/TRAINING PROGRAM

## 2025-06-27 ASSESSMENT — PAIN SCALES - GENERAL: PAINLEVEL_OUTOF10: 3

## 2025-06-27 NOTE — PROGRESS NOTES
Subjective   Patient ID: Dorcas Moore is a 35 y.o. female who presents for Pelvic Pain (Patient states she has been having right side pelvic pain that radiates to her back. Patient was seen this Wednesday at urgent care and was told maybe she has a cyst on her right ovary.).  Patient here for right-sided pelvic pain.  Patient has had this pain for the last few days.  No associated nausea or vomiting.    Pelvic Pain  The patient's primary symptoms include pelvic pain.       Review of Systems   Genitourinary:  Positive for pelvic pain.   All other systems reviewed and are negative.      Objective   Physical Exam  Vitals reviewed.   Constitutional:       General: She is not in acute distress.     Appearance: She is not ill-appearing.   Pulmonary:      Effort: Pulmonary effort is normal.   Abdominal:      General: There is no distension.      Palpations: Abdomen is soft. There is no mass.      Tenderness: There is abdominal tenderness in the right lower quadrant and left lower quadrant. There is no guarding or rebound.   Skin:     Coloration: Skin is not pale.   Neurological:      Mental Status: She is alert.         Assessment/Plan   Diagnoses and all orders for this visit:  Female pelvic pain  -     US PELVIS TRANSABDOMINAL WITH TRANSVAGINAL; Future    Patient here with diffuse lower abdominal tenderness right greater than left.  Patient with a history of right-sided adnexal mass will obtain pelvic ultrasound for ultrasound correlation.  Patient follow-up in 1 month.  Patient for acute torsion at this time.       Juan Francisco Webb MD 06/27/25 4:57 PM

## 2025-06-30 ENCOUNTER — HOSPITAL ENCOUNTER (OUTPATIENT)
Dept: RADIOLOGY | Facility: CLINIC | Age: 35
Discharge: HOME | End: 2025-06-30
Payer: COMMERCIAL

## 2025-06-30 DIAGNOSIS — R10.2 FEMALE PELVIC PAIN: ICD-10-CM

## 2025-06-30 PROCEDURE — 76830 TRANSVAGINAL US NON-OB: CPT | Performed by: RADIOLOGY

## 2025-06-30 PROCEDURE — 76856 US EXAM PELVIC COMPLETE: CPT | Performed by: RADIOLOGY

## 2025-06-30 PROCEDURE — 76856 US EXAM PELVIC COMPLETE: CPT

## 2025-07-07 ENCOUNTER — APPOINTMENT (OUTPATIENT)
Dept: PRIMARY CARE | Facility: CLINIC | Age: 35
End: 2025-07-07
Payer: COMMERCIAL

## 2025-07-07 DIAGNOSIS — R10.31 RIGHT LOWER QUADRANT ABDOMINAL PAIN: Primary | ICD-10-CM

## 2025-07-07 PROCEDURE — 99213 OFFICE O/P EST LOW 20 MIN: CPT | Performed by: FAMILY MEDICINE

## 2025-07-07 NOTE — ASSESSMENT & PLAN NOTE
- Secondary to benign simple physiologic cyst.  Since it has improved, no action is required at this time.  -Patient's pelvic ultrasound result was reviewed and discussed with the patient.  -Patient agreed with the plan of action.

## 2025-07-07 NOTE — PROGRESS NOTES
Subjective   Patient ID: Dorcas Moore is a 35 y.o. female who presents for right low abdominal pain.    The patient consented to have a video virtual appointment done today.  She was in [place] at the time of the visit.  HPI    The patient is a 36 yo AA female with a history of DM II, subjective pulsating tinnitus, GERD,  vit D deficiency, migraine headaches, here for:  1- RLQ abdominal pain that started about 2 weeks ago.  It has improved since then.  A pelvic ultrasound done on July 1, 2025 showed signs of right ovarian dominant 1.9 simple cyst which was likely physiologic/benign.    A review of system was completed.  All systems were reviewed and were normal, except for the ones that are listed in the HPI.    Objective   Physical Exam    Assessment/Plan   Problem List Items Addressed This Visit       Right lower quadrant abdominal pain - Primary    - Secondary to benign simple physiologic cyst.  Since it has improved, no action is required at this time.  -Patient's pelvic ultrasound result was reviewed and discussed with the patient.  -Patient agreed with the plan of action.         Patient to return to office as needed if any change occurs.

## 2025-07-29 ENCOUNTER — APPOINTMENT (OUTPATIENT)
Dept: OBSTETRICS AND GYNECOLOGY | Facility: CLINIC | Age: 35
End: 2025-07-29
Payer: COMMERCIAL

## 2025-08-12 ENCOUNTER — APPOINTMENT (OUTPATIENT)
Dept: OBSTETRICS AND GYNECOLOGY | Facility: CLINIC | Age: 35
End: 2025-08-12
Payer: COMMERCIAL

## 2025-08-20 ENCOUNTER — RESULTS FOLLOW-UP (OUTPATIENT)
Dept: OBSTETRICS AND GYNECOLOGY | Facility: HOSPITAL | Age: 35
End: 2025-08-20
Payer: COMMERCIAL

## 2025-08-29 ENCOUNTER — APPOINTMENT (OUTPATIENT)
Dept: OBSTETRICS AND GYNECOLOGY | Facility: CLINIC | Age: 35
End: 2025-08-29
Payer: COMMERCIAL

## 2025-08-29 VITALS — DIASTOLIC BLOOD PRESSURE: 77 MMHG | SYSTOLIC BLOOD PRESSURE: 122 MMHG | WEIGHT: 182 LBS | BODY MASS INDEX: 31.24 KG/M2

## 2025-08-29 DIAGNOSIS — Z98.84 BARIATRIC SURGERY STATUS: ICD-10-CM

## 2025-08-29 DIAGNOSIS — Z30.016 ENCOUNTER FOR INITIAL PRESCRIPTION OF TRANSDERMAL PATCH HORMONAL CONTRACEPTIVE DEVICE: ICD-10-CM

## 2025-08-29 DIAGNOSIS — B36.9 SUPERFICIAL FUNGAL INFECTION OF SKIN: ICD-10-CM

## 2025-08-29 DIAGNOSIS — L98.7 LOOSE SKIN: ICD-10-CM

## 2025-08-29 DIAGNOSIS — T83.32XA MALPOSITIONED INTRAUTERINE DEVICE (IUD), INITIAL ENCOUNTER: Primary | ICD-10-CM

## 2025-08-29 RX ORDER — NYSTATIN 100000 [USP'U]/G
1 POWDER TOPICAL 3 TIMES DAILY
Qty: 15 G | Refills: 0 | Status: SHIPPED | OUTPATIENT
Start: 2025-08-29 | End: 2026-08-29

## 2025-08-29 ASSESSMENT — PATIENT HEALTH QUESTIONNAIRE - PHQ9
1. LITTLE INTEREST OR PLEASURE IN DOING THINGS: NOT AT ALL
SUM OF ALL RESPONSES TO PHQ9 QUESTIONS 1 & 2: 0
2. FEELING DOWN, DEPRESSED OR HOPELESS: NOT AT ALL

## 2025-08-29 ASSESSMENT — LIFESTYLE VARIABLES
HOW OFTEN DO YOU HAVE A DRINK CONTAINING ALCOHOL: NEVER
HOW MANY STANDARD DRINKS CONTAINING ALCOHOL DO YOU HAVE ON A TYPICAL DAY: PATIENT DOES NOT DRINK
AUDIT-C TOTAL SCORE: 0
HOW OFTEN DO YOU HAVE SIX OR MORE DRINKS ON ONE OCCASION: NEVER
SKIP TO QUESTIONS 9-10: 1

## 2025-08-29 ASSESSMENT — SOCIAL DETERMINANTS OF HEALTH (SDOH)

## 2025-08-29 ASSESSMENT — PAIN SCALES - GENERAL: PAINLEVEL_OUTOF10: 2
